# Patient Record
Sex: MALE | NOT HISPANIC OR LATINO | URBAN - METROPOLITAN AREA
[De-identification: names, ages, dates, MRNs, and addresses within clinical notes are randomized per-mention and may not be internally consistent; named-entity substitution may affect disease eponyms.]

---

## 2020-12-02 ENCOUNTER — HOSPITAL ENCOUNTER (INPATIENT)
Facility: MEDICAL CENTER | Age: 65
LOS: 6 days | DRG: 023 | End: 2020-12-08
Attending: EMERGENCY MEDICINE | Admitting: INTERNAL MEDICINE
Payer: MEDICARE

## 2020-12-02 ENCOUNTER — APPOINTMENT (OUTPATIENT)
Dept: RADIOLOGY | Facility: MEDICAL CENTER | Age: 65
DRG: 023 | End: 2020-12-02
Attending: PSYCHIATRY & NEUROLOGY
Payer: MEDICARE

## 2020-12-02 ENCOUNTER — HOSPITAL ENCOUNTER (OUTPATIENT)
Dept: RADIOLOGY | Facility: MEDICAL CENTER | Age: 65
End: 2020-12-02
Payer: MEDICARE

## 2020-12-02 ENCOUNTER — APPOINTMENT (OUTPATIENT)
Dept: RADIOLOGY | Facility: MEDICAL CENTER | Age: 65
DRG: 023 | End: 2020-12-02
Attending: EMERGENCY MEDICINE
Payer: MEDICARE

## 2020-12-02 DIAGNOSIS — I63.232 ACUTE ISCHEMIC LEFT INTERNAL CAROTID ARTERY (ICA) STROKE (HCC): ICD-10-CM

## 2020-12-02 DIAGNOSIS — I63.412 CEREBROVASCULAR ACCIDENT (CVA) DUE TO EMBOLISM OF LEFT MIDDLE CEREBRAL ARTERY (HCC): ICD-10-CM

## 2020-12-02 DIAGNOSIS — R53.1 RIGHT SIDED WEAKNESS: ICD-10-CM

## 2020-12-02 DIAGNOSIS — I63.512 ACUTE ISCHEMIC LEFT MCA STROKE (HCC): ICD-10-CM

## 2020-12-02 DIAGNOSIS — I63.032 CEREBROVASCULAR ACCIDENT (CVA) DUE TO THROMBOSIS OF LEFT CAROTID ARTERY (HCC): ICD-10-CM

## 2020-12-02 PROBLEM — J20.9 ACUTE BRONCHITIS: Status: ACTIVE | Noted: 2020-12-02

## 2020-12-02 PROBLEM — J44.1 ACUTE EXACERBATION OF CHRONIC OBSTRUCTIVE PULMONARY DISEASE (COPD) (HCC): Status: ACTIVE | Noted: 2020-12-02

## 2020-12-02 LAB
ABO + RH BLD: NORMAL
ABO GROUP BLD: NORMAL
ALBUMIN SERPL BCP-MCNC: 4.5 G/DL (ref 3.2–4.9)
ALBUMIN/GLOB SERPL: 1.7 G/DL
ALP SERPL-CCNC: 102 U/L (ref 30–99)
ALT SERPL-CCNC: 13 U/L (ref 2–50)
ANION GAP SERPL CALC-SCNC: 11 MMOL/L (ref 7–16)
APTT PPP: 30.9 SEC (ref 24.7–36)
AST SERPL-CCNC: 14 U/L (ref 12–45)
BASOPHILS # BLD AUTO: 0.4 % (ref 0–1.8)
BASOPHILS # BLD: 0.05 K/UL (ref 0–0.12)
BILIRUB SERPL-MCNC: 0.5 MG/DL (ref 0.1–1.5)
BLD GP AB SCN SERPL QL: NORMAL
BUN SERPL-MCNC: 12 MG/DL (ref 8–22)
CALCIUM SERPL-MCNC: 9.8 MG/DL (ref 8.5–10.5)
CHLORIDE SERPL-SCNC: 98 MMOL/L (ref 96–112)
CO2 SERPL-SCNC: 24 MMOL/L (ref 20–33)
COVID ORDER STATUS COVID19: NORMAL
CREAT SERPL-MCNC: 0.87 MG/DL (ref 0.5–1.4)
EKG IMPRESSION: NORMAL
EOSINOPHIL # BLD AUTO: 0.02 K/UL (ref 0–0.51)
EOSINOPHIL NFR BLD: 0.2 % (ref 0–6.9)
ERYTHROCYTE [DISTWIDTH] IN BLOOD BY AUTOMATED COUNT: 46.5 FL (ref 35.9–50)
GLOBULIN SER CALC-MCNC: 2.7 G/DL (ref 1.9–3.5)
GLUCOSE SERPL-MCNC: 151 MG/DL (ref 65–99)
HCT VFR BLD AUTO: 49.4 % (ref 42–52)
HGB BLD-MCNC: 16.6 G/DL (ref 14–18)
IMM GRANULOCYTES # BLD AUTO: 0.05 K/UL (ref 0–0.11)
IMM GRANULOCYTES NFR BLD AUTO: 0.4 % (ref 0–0.9)
INR PPP: 1 (ref 0.87–1.13)
LYMPHOCYTES # BLD AUTO: 1.88 K/UL (ref 1–4.8)
LYMPHOCYTES NFR BLD: 14.5 % (ref 22–41)
MCH RBC QN AUTO: 30.3 PG (ref 27–33)
MCHC RBC AUTO-ENTMCNC: 33.6 G/DL (ref 33.7–35.3)
MCV RBC AUTO: 90.1 FL (ref 81.4–97.8)
MONOCYTES # BLD AUTO: 0.92 K/UL (ref 0–0.85)
MONOCYTES NFR BLD AUTO: 7.1 % (ref 0–13.4)
NEUTROPHILS # BLD AUTO: 10.07 K/UL (ref 1.82–7.42)
NEUTROPHILS NFR BLD: 77.4 % (ref 44–72)
NRBC # BLD AUTO: 0 K/UL
NRBC BLD-RTO: 0 /100 WBC
PLATELET # BLD AUTO: 158 K/UL (ref 164–446)
PMV BLD AUTO: 10 FL (ref 9–12.9)
POTASSIUM SERPL-SCNC: 4.4 MMOL/L (ref 3.6–5.5)
PROT SERPL-MCNC: 7.2 G/DL (ref 6–8.2)
PROTHROMBIN TIME: 13.5 SEC (ref 12–14.6)
RBC # BLD AUTO: 5.48 M/UL (ref 4.7–6.1)
RH BLD: NORMAL
SARS-COV+SARS-COV-2 AG RESP QL IA.RAPID: NOTDETECTED
SODIUM SERPL-SCNC: 133 MMOL/L (ref 135–145)
SPECIMEN SOURCE: NORMAL
TROPONIN T SERPL-MCNC: 9 NG/L (ref 6–19)
WBC # BLD AUTO: 13 K/UL (ref 4.8–10.8)

## 2020-12-02 PROCEDURE — 80053 COMPREHEN METABOLIC PANEL: CPT

## 2020-12-02 PROCEDURE — 70498 CT ANGIOGRAPHY NECK: CPT

## 2020-12-02 PROCEDURE — 700101 HCHG RX REV CODE 250: Performed by: INTERNAL MEDICINE

## 2020-12-02 PROCEDURE — 93005 ELECTROCARDIOGRAM TRACING: CPT

## 2020-12-02 PROCEDURE — 700111 HCHG RX REV CODE 636 W/ 250 OVERRIDE (IP)

## 2020-12-02 PROCEDURE — 87426 SARSCOV CORONAVIRUS AG IA: CPT

## 2020-12-02 PROCEDURE — 85730 THROMBOPLASTIN TIME PARTIAL: CPT

## 2020-12-02 PROCEDURE — B31R1ZZ FLUOROSCOPY OF INTRACRANIAL ARTERIES USING LOW OSMOLAR CONTRAST: ICD-10-PCS | Performed by: RADIOLOGY

## 2020-12-02 PROCEDURE — 700111 HCHG RX REV CODE 636 W/ 250 OVERRIDE (IP): Mod: JG | Performed by: INTERNAL MEDICINE

## 2020-12-02 PROCEDURE — 99223 1ST HOSP IP/OBS HIGH 75: CPT | Mod: AI | Performed by: INTERNAL MEDICINE

## 2020-12-02 PROCEDURE — 700111 HCHG RX REV CODE 636 W/ 250 OVERRIDE (IP): Performed by: RADIOLOGY

## 2020-12-02 PROCEDURE — 85025 COMPLETE CBC W/AUTO DIFF WBC: CPT

## 2020-12-02 PROCEDURE — 03CL3Z7 EXTIRPATION OF MATTER FROM LEFT INTERNAL CAROTID ARTERY USING STENT RETRIEVER, PERCUTANEOUS APPROACH: ICD-10-PCS | Performed by: RADIOLOGY

## 2020-12-02 PROCEDURE — 86901 BLOOD TYPING SEROLOGIC RH(D): CPT

## 2020-12-02 PROCEDURE — 700117 HCHG RX CONTRAST REV CODE 255: Performed by: PSYCHIATRY & NEUROLOGY

## 2020-12-02 PROCEDURE — 99291 CRITICAL CARE FIRST HOUR: CPT

## 2020-12-02 PROCEDURE — 86850 RBC ANTIBODY SCREEN: CPT

## 2020-12-02 PROCEDURE — 71045 X-RAY EXAM CHEST 1 VIEW: CPT

## 2020-12-02 PROCEDURE — 86900 BLOOD TYPING SEROLOGIC ABO: CPT

## 2020-12-02 PROCEDURE — 37186 SEC ART THROMBECTOMY ADD-ON: CPT

## 2020-12-02 PROCEDURE — 70496 CT ANGIOGRAPHY HEAD: CPT

## 2020-12-02 PROCEDURE — 99223 1ST HOSP IP/OBS HIGH 75: CPT | Performed by: PSYCHIATRY & NEUROLOGY

## 2020-12-02 PROCEDURE — 84484 ASSAY OF TROPONIN QUANT: CPT

## 2020-12-02 PROCEDURE — 36415 COLL VENOUS BLD VENIPUNCTURE: CPT

## 2020-12-02 PROCEDURE — 037L3DZ DILATION OF LEFT INTERNAL CAROTID ARTERY WITH INTRALUMINAL DEVICE, PERCUTANEOUS APPROACH: ICD-10-PCS | Performed by: RADIOLOGY

## 2020-12-02 PROCEDURE — 770022 HCHG ROOM/CARE - ICU (200)

## 2020-12-02 PROCEDURE — C1887 CATHETER, GUIDING: HCPCS

## 2020-12-02 PROCEDURE — 85610 PROTHROMBIN TIME: CPT

## 2020-12-02 PROCEDURE — 99291 CRITICAL CARE FIRST HOUR: CPT | Performed by: INTERNAL MEDICINE

## 2020-12-02 PROCEDURE — B3171ZZ FLUOROSCOPY OF LEFT INTERNAL CAROTID ARTERY USING LOW OSMOLAR CONTRAST: ICD-10-PCS | Performed by: RADIOLOGY

## 2020-12-02 PROCEDURE — 0042T CT-CEREBRAL PERFUSION ANALYSIS: CPT

## 2020-12-02 PROCEDURE — 700105 HCHG RX REV CODE 258: Performed by: INTERNAL MEDICINE

## 2020-12-02 PROCEDURE — 03CG3Z7 EXTIRPATION OF MATTER FROM INTRACRANIAL ARTERY USING STENT RETRIEVER, PERCUTANEOUS APPROACH: ICD-10-PCS | Performed by: RADIOLOGY

## 2020-12-02 RX ORDER — EPTIFIBATIDE 0.75 MG/ML
2 INJECTION, SOLUTION INTRAVENOUS CONTINUOUS
Status: DISPENSED | OUTPATIENT
Start: 2020-12-02 | End: 2020-12-03

## 2020-12-02 RX ORDER — ASPIRIN 300 MG/1
300 SUPPOSITORY RECTAL ONCE
Status: DISCONTINUED | OUTPATIENT
Start: 2020-12-02 | End: 2020-12-02

## 2020-12-02 RX ORDER — ASPIRIN 81 MG/1
81 TABLET, CHEWABLE ORAL DAILY
Status: DISCONTINUED | OUTPATIENT
Start: 2020-12-03 | End: 2020-12-02

## 2020-12-02 RX ORDER — CLOPIDOGREL BISULFATE 75 MG/1
75 TABLET ORAL DAILY
Status: DISCONTINUED | OUTPATIENT
Start: 2020-12-03 | End: 2020-12-02

## 2020-12-02 RX ORDER — ATORVASTATIN CALCIUM 20 MG/1
80 TABLET, FILM COATED ORAL EVERY EVENING
Status: DISCONTINUED | OUTPATIENT
Start: 2020-12-02 | End: 2020-12-02

## 2020-12-02 RX ORDER — EPTIFIBATIDE 2 MG/ML
180 INJECTION, SOLUTION INTRAVENOUS ONCE
Status: COMPLETED | OUTPATIENT
Start: 2020-12-02 | End: 2020-12-02

## 2020-12-02 RX ORDER — CLOPIDOGREL BISULFATE 75 MG/1
75 TABLET ORAL DAILY
Status: DISCONTINUED | OUTPATIENT
Start: 2020-12-03 | End: 2020-12-03

## 2020-12-02 RX ORDER — LABETALOL HYDROCHLORIDE 5 MG/ML
10-20 INJECTION, SOLUTION INTRAVENOUS EVERY 4 HOURS PRN
Status: DISCONTINUED | OUTPATIENT
Start: 2020-12-02 | End: 2020-12-08 | Stop reason: HOSPADM

## 2020-12-02 RX ORDER — MIDAZOLAM HYDROCHLORIDE 1 MG/ML
INJECTION INTRAMUSCULAR; INTRAVENOUS
Status: COMPLETED
Start: 2020-12-02 | End: 2020-12-02

## 2020-12-02 RX ORDER — EPTIFIBATIDE 2 MG/ML
INJECTION, SOLUTION INTRAVENOUS
Status: COMPLETED
Start: 2020-12-02 | End: 2020-12-02

## 2020-12-02 RX ORDER — LABETALOL HYDROCHLORIDE 5 MG/ML
10 INJECTION, SOLUTION INTRAVENOUS EVERY 4 HOURS PRN
Status: DISCONTINUED | OUTPATIENT
Start: 2020-12-02 | End: 2020-12-02

## 2020-12-02 RX ORDER — AMOXICILLIN 250 MG
2 CAPSULE ORAL 2 TIMES DAILY
Status: DISCONTINUED | OUTPATIENT
Start: 2020-12-02 | End: 2020-12-08 | Stop reason: HOSPADM

## 2020-12-02 RX ORDER — BISACODYL 10 MG
10 SUPPOSITORY, RECTAL RECTAL
Status: DISCONTINUED | OUTPATIENT
Start: 2020-12-02 | End: 2020-12-08 | Stop reason: HOSPADM

## 2020-12-02 RX ORDER — ATORVASTATIN CALCIUM 40 MG/1
40 TABLET, FILM COATED ORAL EVERY EVENING
Status: DISCONTINUED | OUTPATIENT
Start: 2020-12-02 | End: 2020-12-08 | Stop reason: HOSPADM

## 2020-12-02 RX ORDER — POLYETHYLENE GLYCOL 3350 17 G/17G
1 POWDER, FOR SOLUTION ORAL
Status: DISCONTINUED | OUTPATIENT
Start: 2020-12-02 | End: 2020-12-08 | Stop reason: HOSPADM

## 2020-12-02 RX ORDER — ACETAMINOPHEN 325 MG/1
650 TABLET ORAL EVERY 6 HOURS PRN
Status: DISCONTINUED | OUTPATIENT
Start: 2020-12-02 | End: 2020-12-08 | Stop reason: HOSPADM

## 2020-12-02 RX ADMIN — ATROPINE SULFATE 0.5 MG: 0.1 INJECTION, SOLUTION ENDOTRACHEAL; INTRAMUSCULAR; INTRAVENOUS; SUBCUTANEOUS at 22:18

## 2020-12-02 RX ADMIN — EPTIFIBATIDE 15264 MCG: 2 INJECTION, SOLUTION INTRAVENOUS at 22:28

## 2020-12-02 RX ADMIN — IOHEXOL 80 ML: 350 INJECTION, SOLUTION INTRAVENOUS at 20:25

## 2020-12-02 RX ADMIN — IOHEXOL 100 ML: 300 INJECTION, SOLUTION INTRAVENOUS at 23:17

## 2020-12-02 RX ADMIN — EPTIFIBATIDE 2 MCG/KG/MIN: 0.75 INJECTION, SOLUTION INTRAVENOUS at 22:54

## 2020-12-02 RX ADMIN — SODIUM CHLORIDE 5 MG/HR: 9 INJECTION, SOLUTION INTRAVENOUS at 23:29

## 2020-12-02 RX ADMIN — IOHEXOL 40 ML: 350 INJECTION, SOLUTION INTRAVENOUS at 20:24

## 2020-12-02 ASSESSMENT — ENCOUNTER SYMPTOMS: SPEECH CHANGE: 1

## 2020-12-03 ENCOUNTER — APPOINTMENT (OUTPATIENT)
Dept: RADIOLOGY | Facility: MEDICAL CENTER | Age: 65
DRG: 023 | End: 2020-12-03
Attending: INTERNAL MEDICINE
Payer: MEDICARE

## 2020-12-03 LAB
ALBUMIN SERPL BCP-MCNC: 3.6 G/DL (ref 3.2–4.9)
ALBUMIN/GLOB SERPL: 1.5 G/DL
ALP SERPL-CCNC: 87 U/L (ref 30–99)
ALT SERPL-CCNC: 13 U/L (ref 2–50)
AMPHET UR QL SCN: NEGATIVE
ANION GAP SERPL CALC-SCNC: 11 MMOL/L (ref 7–16)
ANION GAP SERPL CALC-SCNC: 9 MMOL/L (ref 7–16)
APPEARANCE UR: CLEAR
AST SERPL-CCNC: 14 U/L (ref 12–45)
BARBITURATES UR QL SCN: NEGATIVE
BENZODIAZ UR QL SCN: NEGATIVE
BILIRUB SERPL-MCNC: 0.5 MG/DL (ref 0.1–1.5)
BILIRUB UR QL STRIP.AUTO: NEGATIVE
BUN SERPL-MCNC: 12 MG/DL (ref 8–22)
BUN SERPL-MCNC: 12 MG/DL (ref 8–22)
BZE UR QL SCN: NEGATIVE
CALCIUM SERPL-MCNC: 8.4 MG/DL (ref 8.5–10.5)
CALCIUM SERPL-MCNC: 9 MG/DL (ref 8.5–10.5)
CANNABINOIDS UR QL SCN: POSITIVE
CHLORIDE SERPL-SCNC: 101 MMOL/L (ref 96–112)
CHLORIDE SERPL-SCNC: 105 MMOL/L (ref 96–112)
CHOLEST SERPL-MCNC: 151 MG/DL (ref 100–199)
CO2 SERPL-SCNC: 21 MMOL/L (ref 20–33)
CO2 SERPL-SCNC: 22 MMOL/L (ref 20–33)
COLOR UR: YELLOW
CREAT SERPL-MCNC: 0.81 MG/DL (ref 0.5–1.4)
CREAT SERPL-MCNC: 0.85 MG/DL (ref 0.5–1.4)
ERYTHROCYTE [DISTWIDTH] IN BLOOD BY AUTOMATED COUNT: 45.1 FL (ref 35.9–50)
EST. AVERAGE GLUCOSE BLD GHB EST-MCNC: 166 MG/DL
GLOBULIN SER CALC-MCNC: 2.4 G/DL (ref 1.9–3.5)
GLUCOSE SERPL-MCNC: 152 MG/DL (ref 65–99)
GLUCOSE SERPL-MCNC: 156 MG/DL (ref 65–99)
GLUCOSE UR STRIP.AUTO-MCNC: NEGATIVE MG/DL
HBA1C MFR BLD: 7.4 % (ref 0–5.6)
HCT VFR BLD AUTO: 46.3 % (ref 42–52)
HDLC SERPL-MCNC: 52 MG/DL
HGB BLD-MCNC: 15.4 G/DL (ref 14–18)
KETONES UR STRIP.AUTO-MCNC: ABNORMAL MG/DL
LDLC SERPL CALC-MCNC: 81 MG/DL
LEUKOCYTE ESTERASE UR QL STRIP.AUTO: NEGATIVE
MCH RBC QN AUTO: 29.6 PG (ref 27–33)
MCHC RBC AUTO-ENTMCNC: 33.3 G/DL (ref 33.7–35.3)
MCV RBC AUTO: 89 FL (ref 81.4–97.8)
METHADONE UR QL SCN: NEGATIVE
MICRO URNS: ABNORMAL
NITRITE UR QL STRIP.AUTO: NEGATIVE
OPIATES UR QL SCN: NEGATIVE
OXYCODONE UR QL SCN: NEGATIVE
PCP UR QL SCN: NEGATIVE
PH UR STRIP.AUTO: 5 [PH] (ref 5–8)
PLATELET # BLD AUTO: 150 K/UL (ref 164–446)
PMV BLD AUTO: 10.4 FL (ref 9–12.9)
POTASSIUM SERPL-SCNC: 4 MMOL/L (ref 3.6–5.5)
POTASSIUM SERPL-SCNC: 4 MMOL/L (ref 3.6–5.5)
PROPOXYPH UR QL SCN: NEGATIVE
PROT SERPL-MCNC: 6 G/DL (ref 6–8.2)
PROT UR QL STRIP: NEGATIVE MG/DL
RBC # BLD AUTO: 5.2 M/UL (ref 4.7–6.1)
RBC UR QL AUTO: NEGATIVE
SODIUM SERPL-SCNC: 134 MMOL/L (ref 135–145)
SODIUM SERPL-SCNC: 135 MMOL/L (ref 135–145)
SP GR UR REFRACTOMETRY: >1.045
TRIGL SERPL-MCNC: 92 MG/DL (ref 0–149)
UROBILINOGEN UR STRIP.AUTO-MCNC: 0.2 MG/DL
WBC # BLD AUTO: 9 K/UL (ref 4.8–10.8)

## 2020-12-03 PROCEDURE — 85027 COMPLETE CBC AUTOMATED: CPT

## 2020-12-03 PROCEDURE — 80061 LIPID PANEL: CPT

## 2020-12-03 PROCEDURE — 70450 CT HEAD/BRAIN W/O DYE: CPT

## 2020-12-03 PROCEDURE — 700105 HCHG RX REV CODE 258: Performed by: INTERNAL MEDICINE

## 2020-12-03 PROCEDURE — 74018 RADEX ABDOMEN 1 VIEW: CPT

## 2020-12-03 PROCEDURE — A9270 NON-COVERED ITEM OR SERVICE: HCPCS | Performed by: INTERNAL MEDICINE

## 2020-12-03 PROCEDURE — 80053 COMPREHEN METABOLIC PANEL: CPT

## 2020-12-03 PROCEDURE — 80048 BASIC METABOLIC PNL TOTAL CA: CPT

## 2020-12-03 PROCEDURE — 81003 URINALYSIS AUTO W/O SCOPE: CPT

## 2020-12-03 PROCEDURE — 700111 HCHG RX REV CODE 636 W/ 250 OVERRIDE (IP): Mod: JG | Performed by: INTERNAL MEDICINE

## 2020-12-03 PROCEDURE — 700102 HCHG RX REV CODE 250 W/ 637 OVERRIDE(OP): Performed by: INTERNAL MEDICINE

## 2020-12-03 PROCEDURE — 51798 US URINE CAPACITY MEASURE: CPT

## 2020-12-03 PROCEDURE — 700101 HCHG RX REV CODE 250: Performed by: INTERNAL MEDICINE

## 2020-12-03 PROCEDURE — 770022 HCHG ROOM/CARE - ICU (200)

## 2020-12-03 PROCEDURE — 83036 HEMOGLOBIN GLYCOSYLATED A1C: CPT

## 2020-12-03 PROCEDURE — A9270 NON-COVERED ITEM OR SERVICE: HCPCS | Performed by: PSYCHIATRY & NEUROLOGY

## 2020-12-03 PROCEDURE — 99232 SBSQ HOSP IP/OBS MODERATE 35: CPT | Performed by: PSYCHIATRY & NEUROLOGY

## 2020-12-03 PROCEDURE — 92610 EVALUATE SWALLOWING FUNCTION: CPT

## 2020-12-03 PROCEDURE — 99291 CRITICAL CARE FIRST HOUR: CPT | Performed by: INTERNAL MEDICINE

## 2020-12-03 PROCEDURE — 80307 DRUG TEST PRSMV CHEM ANLYZR: CPT

## 2020-12-03 PROCEDURE — 700102 HCHG RX REV CODE 250 W/ 637 OVERRIDE(OP): Performed by: PSYCHIATRY & NEUROLOGY

## 2020-12-03 RX ORDER — ASPIRIN 300 MG/1
300 SUPPOSITORY RECTAL DAILY
Status: DISCONTINUED | OUTPATIENT
Start: 2020-12-03 | End: 2020-12-04

## 2020-12-03 RX ORDER — CLOPIDOGREL BISULFATE 75 MG/1
75 TABLET ORAL DAILY
Status: DISCONTINUED | OUTPATIENT
Start: 2020-12-04 | End: 2020-12-08 | Stop reason: HOSPADM

## 2020-12-03 RX ORDER — CLOPIDOGREL BISULFATE 75 MG/1
300 TABLET ORAL ONCE
Status: COMPLETED | OUTPATIENT
Start: 2020-12-03 | End: 2020-12-03

## 2020-12-03 RX ORDER — ASPIRIN 325 MG
325 TABLET ORAL DAILY
Status: DISCONTINUED | OUTPATIENT
Start: 2020-12-03 | End: 2020-12-04

## 2020-12-03 RX ORDER — CLOPIDOGREL BISULFATE 75 MG/1
75 TABLET ORAL DAILY
Status: DISCONTINUED | OUTPATIENT
Start: 2020-12-04 | End: 2020-12-03

## 2020-12-03 RX ADMIN — CLOPIDOGREL BISULFATE 300 MG: 75 TABLET ORAL at 10:45

## 2020-12-03 RX ADMIN — SODIUM CHLORIDE 2.5 MG/HR: 9 INJECTION, SOLUTION INTRAVENOUS at 05:23

## 2020-12-03 RX ADMIN — EPTIFIBATIDE 2 MCG/KG/MIN: 0.75 INJECTION, SOLUTION INTRAVENOUS at 05:39

## 2020-12-03 RX ADMIN — ATORVASTATIN CALCIUM 40 MG: 40 TABLET, FILM COATED ORAL at 18:01

## 2020-12-03 RX ADMIN — ASPIRIN 325 MG: 325 TABLET, FILM COATED ORAL at 10:45

## 2020-12-03 ASSESSMENT — FIBROSIS 4 INDEX: FIB4 SCORE: 1.6

## 2020-12-03 NOTE — DISCHARGE PLANNING
[facility] Transitional Care Coordination     Referral from:  Dr. Castro   Facesheet indicates: no provider identified   Potential Rehab Diagnosis: stroke       D/C support: Unknown    Physiatry consultation pending per protocol.        Last Covid test date.  Results for TYLER MERCADO (MRN [id number]) as of 12/3/2020 09:45   Ref. Range 12/2/2020 20:09   COVID Order Status Unknown Received   SARS-COV ANTIGEN HARDEEP Latest Ref Range: Not-Detected  NotDetected   SARS-CoV-2 Source Unknown Nasal Swab   Waiting on additional information to determine appropriateness for acute inpatient rehabilitation. Will continue to follow.     Thank you for the referral.

## 2020-12-03 NOTE — THERAPY
Speech Language Pathology   Initial Assessment     Patient Name: Tejinder Ruiz  AGE:  65 y.o., SEX:  male  Medical Record #: [id number]  Today's Date: 12/3/2020     Precautions  Precautions: Fall Risk, Swallow Precautions ( See Comments)    Assessment    Patient is 65 y.o. male admitted 12/1 with profound expresisve aphasia. Imaging revealed left ICA and left basilar artery with acute left MCA stroke symptoms. Status post thrombectomy on 12/2. No previous SLP notes in EMR.  Imaging indicated negative CXR. Brain MRI pending.   Patient presented with expressive/receptive language difficulty throughout the evaluation with SLP. However, noted patient to improve in expressive and receptive language throughout the session, going from nonverbal to responding in 1-2 word phrases. Receptive language difficulty v. Questionable apraxic behavior noted during the oral motor exam, as patient had difficulty following 1-step directions for lingual ROM and labial protraction/retraction. As a result, oral motor examination discontinued.   Patient participated in oral care and brief suction to clear phlegm from oral cavity prior to initiation of PO trials. Patient consumed trials of ice chips, thin liquid (TN0) via tsp, controlled single cup sip and straw, mildly thick liquid (MT2) via tsp, controlled cup sip and straw, liquidized textures (LQ3), pureed textures (PU4) and minced and moist textures (MM5). Observed immediate, overt s/sx of aspiration with large cup sip and straw sip of thin liquids, with strong, prolonged cough response. Suction attempted, but not successful to clear. No additional overt s/s of aspiration observed immediately following trials of MT2, LQ3, PU4 or MM5. However, patient did present with intermittent single wet cough, questionable if from pharyngeal residue or recovery from suspected aspiration event with thin liquid.   Patient demonstrated impulsivity with independent in take, taking large bites/sips and not  following directions for small, single bites/sips (question receptive language deficit). As a result, he will warrant strict 1:1 assistance with all PO intake to maintain safety with PO intake.     Plan  Advance diet to puree textures (PU4) and mildly thick liquids (MT2) with medication to be crushed in puree. Please provide 1:1 assistance with all intake due to patient impulsivity with independent eating/drinking and difficulty following directions for safety with intake. Discontinue if coughing.   SLP to continue to follow for dysphagia therapy and completion of cognitive-linguistic assessment as appropriate.     Recommend Speech Therapy 5 times per week until therapy goals are met for the following treatments:  Dysphagia Training.    Discharge Recommendations: Recommend post-acute placement for additional speech therapy services prior to discharge home    Subjective    Patient able to rouse with verbal cues. Demonstrated expressive/recpetive language difficulty, however, improving in these areas throughout the evaluation.      Objective       12/03/20 1017   Charge Group   SLP Oral Pharyngeal Evaluation Oral Pharyngeal Evaluation   Oral Motor Eval    Is Patient Able to Complete Oral Motor Eval Yes but Impaired   Labial Function   Labial Structure At Rest Within Functional Limits   Lingual Function   Lingual Structure At Rest Within Functional Limits   Lingual Protrude Not Tested   Lingual Retract Not Tested   Elevate In Mouth Not Tested   Elevate Outside Mouth Not Tested   Lateralization Not Tested   Lick Lips (Circular) Not Tested   Jaw   Jaw Structure At Rest Within Functional Limits   Jaw Open / Resist Not Tested   Jaw Close / Resist Not Tested   Velar Function   Velar Structure At Rest Within Functional Limits   Laryngeal Function   Voice Quality Minimal   Volutional Cough Within Functional Limits   Excursion Upon Swallow Complete   Oral Food Presentation   Ice Chips Within Functional Limits   Single Swallow  "Mildly Thick (2) - (Nectar Thick)  Within Functional Limits   Serial Swallow Mildly Thick (2) - (Nectar Thick) Not Tested   Single Swallow Thin (0) Within Functional Limits   Serial Swallow Thin (0) Moderate   Liquidised (3) Within Functional Limits   Pureed (4) Within Functional Limits   Minced & Moist (5) - (Dysphagia II) Not Tested   Soft & Bite-Sized (6) - (Dysphagia III) Not Tested   Regular (7) Not Tested   Regular-Easy to Chew (7) Not Tested   Self Feeding Needs Total Assistance   Dysphagia Strategies / Recommendations   Strategies / Interventions Recommended (Yes / No) Yes   Compensatory Strategies Strict 1:1 Feeding;No Straws;Multiple Swallows;Single Sips / Bites;Alternate Solids / Liquids   Diet / Liquid Recommendation Mildly Thick (2) - (Nectar Thick);Puree (4)   Medication Administration  Crush all Medications in Puree   Therapy Interventions Dysphagia Therapy By Speech Language Pathologist   Follow Up SLP Evaluation Cognitive-Lingustic Assessment Needed   Patient / Family Goals   Patient / Family Goal #1 Nodded \"Yes\" to oral trials   Goal #1 Outcome Progressing as expected   Short Term Goals   Short Term Goal # 1 Patient will consume puree textures (PU4) and mildly thick liquids (MT2) with 1:1 assistance without ovet s/s of aspiration    Goal Outcome # 1 Progressing as expected       "

## 2020-12-03 NOTE — PROGRESS NOTES
Critical Care Progress Note    Date of admission  12/2/2020    Chief Complaint  Mr. Ruiz is a 65 year old male with an unknown past medical history who was transferred from [facility] for stroke symptoms.  The patient was last seen normal around 8-9 pm.  The patient was found unresponsive by his roommate this evening at 6pm.  When EMS arrived, the patient had a GCS of 3 but was alert and aphasic with right sided deficits.  The patient's NIH score was 27 at the outlying facility.  He was not an alteplase candidate.  His CT-CTA head revealed thrombus to the left ICA and left basilar artery with acute left MCA stroke symptoms.  He was transferred to [facility] and seen by neurology as well as IR.  He was sent to IR for left ICA stenting and then integrilin infusion. Taken Dr Elizondo.     Hospital Course  No notes on file    Interval Problem Update  Reviewed last 24 hour events:  Goes by Brandon  Neuro: follows commands, perrl, slight right side weakness gross intact, expressive aphasia, aox4  HR: snr  SBP: goal < 140 cardene at 3  Tmax: afebrile  GI: NPO, BM   UOP: good   Lines: peripheral IV's  Resp: room air   Vte: none   PPI/H2:n/a  Antibx: none    plavix 75mg, aspirin  Failing swallow chocking  MRI pending  xr abdomen      Review of Systems  Review of Systems   Unable to perform ROS: Language        Vital Signs for last 24 hours   Temp:  [36.7 °C (98 °F)] 36.7 °C (98 °F)  Pulse:  [59-93] 68  Resp:  [14-24] 16  BP: (107-183)/(55-99) 119/59  SpO2:  [91 %-100 %] 100 %    Hemodynamic parameters for last 24 hours       Respiratory Information for the last 24 hours       Physical Exam   Physical Exam  Vitals signs and nursing note reviewed.   Constitutional:       General: He is not in acute distress.     Appearance: He is not ill-appearing.      Comments: Sitting up eating pudding in no acute distress   HENT:      Head: Normocephalic.      Nose: No congestion.      Mouth/Throat:      Mouth: Mucous membranes  are dry.      Pharynx: No oropharyngeal exudate.   Eyes:      Extraocular Movements: Extraocular movements intact.      Pupils: Pupils are equal, round, and reactive to light.   Neck:      Musculoskeletal: No neck rigidity or muscular tenderness.   Cardiovascular:      Rate and Rhythm: Normal rate.      Heart sounds: No murmur.   Pulmonary:      Effort: No respiratory distress.      Breath sounds: No stridor. No wheezing or rhonchi.   Abdominal:      General: There is no distension.      Palpations: There is no mass.      Tenderness: There is no abdominal tenderness. There is no guarding or rebound.      Hernia: No hernia is present.   Genitourinary:     Comments: Right groin site with angioseal  Musculoskeletal:         General: No swelling or tenderness.   Skin:     Coloration: Skin is not jaundiced or pale.   Neurological:      Mental Status: He is alert.      Comments: aox3-4 able to shake his head yes and no and answer some question but with expressive aphasia, weakness to right hand 3+/5, full strength on left, sensation intact, no facial droop, speech clear.    Psychiatric:         Mood and Affect: Mood normal.         Medications  Current Facility-Administered Medications   Medication Dose Route Frequency Provider Last Rate Last Admin   • clopidogrel (PLAVIX) tablet 300 mg  300 mg Oral Once Ysabel Elizondo M.D.       • atorvastatin (LIPITOR) tablet 40 mg  40 mg Oral Q EVENING Geovanny Castro M.D.   Stopped at 12/02/20 2115   • senna-docusate (PERICOLACE or SENOKOT S) 8.6-50 MG per tablet 2 Tab  2 Tab Oral BID Bigg Morfin M.D.   Stopped at 12/02/20 2145    And   • polyethylene glycol/lytes (MIRALAX) PACKET 1 Packet  1 Packet Oral QDAY PRN Bigg Morfin M.D.        And   • magnesium hydroxide (MILK OF MAGNESIA) suspension 30 mL  30 mL Oral QDAY PRN Bigg Morfin M.D.        And   • bisacodyl (DULCOLAX) suppository 10 mg  10 mg Rectal QDAY PRN Bigg Morfin M.D.       • acetaminophen (Tylenol)  tablet 650 mg  650 mg Oral Q6HRS PRN Bigg Morfin M.D.       • niCARdipine (CARDENE) 25 mg in  mL Infusion  0-15 mg/hr Intravenous Continuous Isaac Hager M.D. 40 mL/hr at 12/03/20 0800 4 mg/hr at 12/03/20 0800   • eptifibatide 0.75 mg/mL (INTEGRILIN) infusion  2 mcg/kg/min Intravenous Continuous Ysabel Elizondo M.D. 13.6 mL/hr at 12/03/20 0539 2 mcg/kg/min at 12/03/20 0539   • aspirin EC (ECOTRIN) tablet 325 mg  325 mg Oral DAILY Geovanny Castro M.D.       • labetalol (NORMODYNE/TRANDATE) injection 10-20 mg  10-20 mg Intravenous Q4HRS PRN Ysabel Elizondo M.D.           Fluids    Intake/Output Summary (Last 24 hours) at 12/3/2020 0839  Last data filed at 12/3/2020 0800  Gross per 24 hour   Intake 416.25 ml   Output 325 ml   Net 91.25 ml       Laboratory          Recent Labs     12/02/20 2014 12/03/20  0030 12/03/20  0345   SODIUM 133* 135 134*   POTASSIUM 4.4 4.0 4.0   CHLORIDE 98 105 101   CO2 24 21 22   BUN 12 12 12   CREATININE 0.87 0.85 0.81   CALCIUM 9.8 8.4* 9.0     Recent Labs     12/02/20 2014 12/03/20  0030 12/03/20  0345   ALTSGPT 13 13  --    ASTSGOT 14 14  --    ALKPHOSPHAT 102* 87  --    TBILIRUBIN 0.5 0.5  --    GLUCOSE 151* 156* 152*     Recent Labs     12/02/20 2014 12/03/20  0030 12/03/20  0345   WBC 13.0*  --  9.0   NEUTSPOLYS 77.40*  --   --    LYMPHOCYTES 14.50*  --   --    MONOCYTES 7.10  --   --    EOSINOPHILS 0.20  --   --    BASOPHILS 0.40  --   --    ASTSGOT 14 14  --    ALTSGPT 13 13  --    ALKPHOSPHAT 102* 87  --    TBILIRUBIN 0.5 0.5  --      Recent Labs     12/02/20 2014 12/03/20  0345   RBC 5.48 5.20   HEMOGLOBIN 16.6 15.4   HEMATOCRIT 49.4 46.3   PLATELETCT 158* 150*   PROTHROMBTM 13.5  --    APTT 30.9  --    INR 1.00  --        Imaging  X-Ray:  I have personally reviewed the images and compared with prior images.  CT:    Reviewed    Assessment/Plan  * Acute ischemic left MCA stroke (HCC)- (present on admission)  Assessment & Plan  Due to left ICA  stenosis/thrombus with left basilar artery thrombus  Not a candidate for thrombolytics  IR suite for left ICA thrombus with right M1 thrombus-->s/p thrombectomy with stent and MCA thrombectomy:  TICI 3  Integrilin infusion ongoing until 12/3 at 1500  SBP goals < 140  Nicardipine drip for SBP goals as well as labetalol as needed  High dose statin therapy  Load aspirin 325mg and Plavix 300mg at 11 am  Maintain euglycemia with BG goals 120-180s  Neuro checks every 1 hours  Neurology following  ECHO  PT/OT/SLP           VTE:  integrelin gtt  Ulcer: Not Indicated  Lines: None    I have performed a physical exam and reviewed and updated ROS and Plan today (12/3/2020). In review of yesterday's note (12/2/2020), there are no changes except as documented above.     Discussed patient condition and risk of morbidity and/or mortality with RN, RT, Pharmacy, Charge nurse / hot rounds, Patient and neurology     The patient remains critically ill post thrombectomy on integerlin gtt.  Critical care time = 40 minutes in directly providing and coordinating critical care and extensive data review.  No time overlap and excludes procedures.

## 2020-12-03 NOTE — ED TRIAGE NOTES
"Chief Complaint   Patient presents with   • Possible Stroke     Patient a transfer from [facility] with a L ICA occlusion; pt to go to IR       BIB Care flight to RD 12, pt on monitor and in gown. Pt came from [facility], per Careflight patient was found unresponsive with Right sided deficits, last known to be \"normal\" 8-9pm last night. WHen found down GCS of 3. When taken to [facility] patient was alert and oriented with aphasia and right sided weakness. Per [facility] patient has a L ICA occlusion, patient a STROKE IR. Patient has a cough per careflight, rule-out COVID. Upon arrival, careflight stated pt attempted at speech. Patient did not receive TPA. NIH 16 upon arrival.    Patient to CT.     Medications given en route: 4 mg of zofran    Ht 1.753 m (5' 9\")   Wt 84.8 kg (186 lb 15.2 oz)   BMI 27.61 kg/m²   "

## 2020-12-03 NOTE — CONSULTS
"Neurology STROKE IR CODE H&P  Neurohospitalist Service, [facility]    Referring Physician: Cande Hyde M.D.    STROKE IR CODE: not a candidate for tPA given LKN is 2100 12/1/20.    HPI: Tejinder Ruiz is a 65 y.o. man with vascular risk factors presenting for whom neurology has been consulted as a transfer from [facility] in the setting of acute L ICA occlusion as evidenced on outside CTA imaging.  Unable to obtain subjective history given aphasia.  As documented by Cande Hyde MD on 12/2/20, \"Per EMS, the patient was found unresponsive on the ground on his right side with right sided deficits. He was last seen at normal baseline at 8-9 PM yesterday. He was brought into [facility] with GCS of 3 and was noted to be alert but aphasic. He had a NIH of 27 of the outside facility and now in the ED [[facility]] he has an NIH of 16. On route to the ED, his pressure was around 150s-180s over 90s.\"  Right sided weakness and severe aphasia persist.  When I was paged for this patient, the first action taken was to order STAT CTP study to identify evidence of penumbra.    Review of systems: In addition to what is detailed in the HPI above, all other systems reviewed and are negative.  Does not have a headache nor vertigo.    Past Medical History:   Unable to obtain given severe aphasia    FHx:  Unable to obtain given severe aphasia    SHx:  Unable to obtain given severe aphasia    Allergies:  Unable to obtain given severe aphasia    Medications:  No current facility-administered medications for this encounter.   No current outpatient medications on file.    Physical Examination:    Vitals:    12/02/20 2015 12/02/20 2034   BP:  (!) 183/99   Pulse:  74   SpO2:  97%   Weight: 84.8 kg (186 lb 15.2 oz)    Height: 1.753 m (5' 9\")    SBP at 2020 12/2/20: 180s    General: Patient is in no acute distress  Eyes: examination of optic disks not indicated at this time given acuity of consult  CV: " RRR    NEUROLOGICAL EXAM:     Mental status: Awake, alert and disoriented, does not follow simple commands  Speech and language: speech output diminished. The patient is unable to name or repeat.  Severe aphasia.  Cranial nerve exam: Pupils are equal, round and reactive to light bilaterally. Visual fields: diminished blink to threat on the right compared to left. Extraocular muscles are intact to facial tracking with notable right gaze preference. Face is notable for right sided nasolabial fold flattening. Due to degree of aphasia, unable to appropriately assess sensation in the face, hearing to finger rub, palate elevation, shoulder shrug, or tongue.  Motor exam: right sided hemiplegia with flaccid tone on right compared to left. No abnormal movements were seen on exam.  Sensory exam: diminished withdrawal from noxious stimuli b/l  Deep tendon reflexes: 1+ and symmetric  Coordination: not participatory  Gait: deferred given patient preference    NIH Stroke Scale:    1a. Level of Consciousness (Alert, drowsy, etc): 0= Alert    1b. LOC Questions (Month, age): 2= Incorrect    1c. LOC Commands (Open/close eyes make fist/let go): 2= Incorrect    2.   Best Gaze (Eyes open - patient follows examiner's finger on face): 1= Partial gaze palay    3.   Visual Fields (introduce visual stimulus/threat to patient's field quadrants): 1= Partial Hemiania  4.   Facial Paresis (Show teeth, raise eyebrows and squeeze eyes shut): 1= Minor     5a. Motor Arm - Left (Elevate arm to 90 degrees if patient is sitting, 45 degrees if  supine): 0= No drift    5b. Motor Arm - Right (Elevate arm to 90 degrees if patient is sitting, 45 degrees if supine): 3= No effort against gravity    6a. Motor Leg - Left (Elevate leg 30 degrees with patient supine): 0= No drift    6b. Motor Leg - Right  (Elevate leg 30 degrees with patient supine): 3= No effort against gravity    7.   Limb Ataxia (Finger-nose, heel down shin): 0= No ataxia    8.   Sensory (Pin  prick to face, arm, trunk and leg - compare side to side): 0= Normal    9.  Best Language (Name item, describe a picture and read sentences): 2= Severe aphasia    10. Dysarthria (Evaluate speech clarity by patient repeating listed words): 2= Near to unintelligible or worse    11. Extinction and Inattention (Use information from prior testing to identify neglect or  double simultaneous stimuli testing): 0= No neglect    Total NIH Score: 16    Modified Snyder Scale (MRS): 2 = Slight disability; unable to perform all previous activities but able to look after own affairs without assistance    Objective Data:    Labs:  No results found for: PROTHROMBTM, INR   Lab Results   Component Value Date/Time    WBC 13.0 (H) 12/02/2020 08:14 PM    RBC 5.48 12/02/2020 08:14 PM    HEMOGLOBIN 16.6 12/02/2020 08:14 PM    HEMATOCRIT 49.4 12/02/2020 08:14 PM    MCV 90.1 12/02/2020 08:14 PM    MCH 30.3 12/02/2020 08:14 PM    MCHC 33.6 (L) 12/02/2020 08:14 PM    MPV 10.0 12/02/2020 08:14 PM    NEUTSPOLYS 77.40 (H) 12/02/2020 08:14 PM    LYMPHOCYTES 14.50 (L) 12/02/2020 08:14 PM    MONOCYTES 7.10 12/02/2020 08:14 PM    EOSINOPHILS 0.20 12/02/2020 08:14 PM    BASOPHILS 0.40 12/02/2020 08:14 PM      No results found for: SODIUM, POTASSIUM, CHLORIDE, CO2, GLUCOSE, BUN, CREATININE, BUNCREATRAT, GLOMRATE   No results found for: CHOLSTRLTOT, LDL, HDL, TRIGLYCERIDE    No results found for: ALKPHOSPHAT, ASTSGOT, ALTSGPT, TBILIRUBIN     Imaging/Testing:    I interpreted and/or reviewed the patient's neuroimaging    OUTSIDE IMAGES-DX CHEST   Final Result      OUTSIDE IMAGES-CT HEAD   Final Result      OUTSIDE IMAGES-CT HEAD   Final Result      DX-CHEST-PORTABLE (1 VIEW)    (Results Pending)   CT-CTA HEAD WITH & W/O-POST PROCESS    (Results Pending)   CT-CTA NECK WITH & W/O-POST PROCESSING    (Results Pending)   CT-CEREBRAL PERFUSION ANALYSIS    (Results Pending)   MR-BRAIN-W/O    (Results Pending)     CTA neck 12/2/20: L ICA is severely  stenotic  CTA head 12/2/20: L M1 stenosis    Assessment and Plan:    Tejinder Ruiz is a 65 y.o. man with vascular risk factors presenting at a transfer from [facility] evening of 12/2/20, for whom neurology has been consulted for acute L MCA syndrome.  Patient out of window to be considered a candidate for tPA.  Although the CTP does show an area of oligemia, the CTA does not demonstrate overt intracranial occlusion, but rather evidence of severe flow limiting stenosis localized to the left M1.  CTA neck shows severe L ICA stenosis with likely intraluminal thrombus. The differential diagnosis as it pertains to etiology includes large vessel disease ie. atherosclerotic disease and athero-thromboembolism.    Plan:    - STAT IR Intervention to address simultaneous L ICA critical stenosis via stenting and DSA to further investigate L MCA intracranial disease  - anticipate admission to ICU post IR intervention with Integrilin gtt  --> follow up note from interventionalist regarding acute antiplatelet and Integrilin gtt plan with timing details  - Neurology checks and vital signs per protocol  - obtain normoglycemia and avoid hypo- or hyper -natremia; aim for normothermia  - long term antiplatelet regimen: secondary stroke prevention therapy with ASA 81mg and Plavix 75mg for 90 days in the setting of anticipated stent; to be followed up by neuro-IR  - initiate high intensity statin per SPARCL Trial  - serum studies for stroke risk factors: lipid panel & hemoglobin A1C, Utox  - To identify stroke territory, obtain MRI brain  - Obtain a 2D echocardiogram w/ bubble study  - evaluate and treat with PT/OT/ST; physiatry consultation    After the endovascular intervention, please follow the following recommendations regarding blood pressure parameters:    If TICI 3: maintain systolic BP < 140  If TICI 2b: maintain systolic BP < 160  If TICI 2a or less, maintain systolic BP < 180 per tPA protocol    This is in an effort to  minimize reperfusion injury and/or hemorrhagic conversion    The evaluation of the patient, and recommended management, was discussed with Dr. Cande Hyde, ERP, Dr. Ysabel Elizondo, ICU, Dr. TREVOR Marshall, IR, and with Dr. Santiago Underwood, neuro-IR.    Geovanny Castro MD  Neurohospitalist, Acute Care Services   of Neurology

## 2020-12-03 NOTE — PROGRESS NOTES
Patient underwent a cerebral angiogram with carotid stenting and thrombectomy by Dr. Underwood, assisted by Vannessa RT, Felix RT and Delaney RT. In ER, patient is aphasic, unable to speak. On room air, restless and anxious. Brief assessment at bedside in ER with RN at 2140, then brought to IR suite by this RN via [facility]. 2 MDs signed consent, patient is unable to sign. Patient incontinent of bowel and bladder. Right side weakness noted, patient unable to participate in full neuro assessment as he is aphasic.    Patient was placed in a supine position. MD Underwood established right femoral arterial access. Vitals were taken every 5 minutes and remained stable during procedure (see doc flow sheet for results). CO2 waveform capnography was monitored and remained 20-33 throughout procedure. Patient did not receive sedation during case. MD Underwood placed carotid stent in left ICA. Manual suction used to remove thrombus. Atropine and Integrilin given during case, please see EMR. An angioseal was deployed in right femoral artery with hemostasis, then a tegaderm and gauze dressing was placed over right groin surgical site. Report called to Mary SERNA. Mary SERNA and CCT arrived to IR suite at 1140, bedside NIHH scale completed. Integrilin and cardene drips infusing as ordered. Patient transported to [place] [address] with Mary SERNA and CCT on monitor, patient left with chart and all personal belongings. All post-op orders reviewed and released with Mary SERNA.     TICI score= 3 at 2249   Angioseal placed at 2300    [facility]ott RX Acculink Carotid Stent System 6mm x 40mm x 8mm placed in left ICA  Ref# [id number] Lot# [id number] Exp date 07/31/2022     TERUMO Angio-Seal VIP Vascular Closure Device 8Fr placed in right femoral artery  Ref# [id number] Lot# [id number] Exp Date 04/30/2021

## 2020-12-03 NOTE — THERAPY
Missed Therapy     Patient Name: Tejinder Ruiz  Age:  65 y.o., Sex:  male  Medical Record #: [id number]  Today's Date: 12/3/2020           12/03/20 0809   Interdisciplinary Plan of Care Collaboration   IDT Collaboration with  Other (See Comments)  (EMR)   Collaboration Comments  OT consult received, pt currently on bedrest until 2120 tonight. Will see post bedrest tomorrow as appropriate/able.

## 2020-12-03 NOTE — OR SURGEON
Immediate Post- Operative Note        PostOp Diagnosis: Left ICA occlusion and MCA thrombus       Procedure(s):Left ICA stent and thrombectomy and MCA thrombectomy    TICI 3       Estimated Blood Loss: Less than 5 ml        Complications: None            12/2/2020     11:13 PM     Fish Underwood M.D.

## 2020-12-03 NOTE — PROGRESS NOTES
S/P Left ICA stent?thrombectomy  and MCA thrombectomy     Final angio:    Patent stent and TICI 3     Recommendation:    BP less than 140 mm hg    continuous Integrilin drip.    Plavix 300 mg and asprin 325 mg at 11 AM -12/3    Stop the Integrilin at 3 PM -12/3( 4 hours after the DAPT loading doses)

## 2020-12-03 NOTE — ASSESSMENT & PLAN NOTE
left ICA stenosis/thrombus with left basilar artery thrombus  IR suite for left ICA thrombus with right M1 thrombus-->s/p thrombectomy with stent and MCA thrombectomy:  TICI 3  Hemorrhagic transformation   Strict blood pressure control with goals < 140  High dose statin therapy  Maintain euglycemia with BG goals 120-180s  Neuro checks every 1 hours  Neurology following  ECHO  PT/OT/SLP  Aspiration precautions  Aspirin to 81 and plavix

## 2020-12-03 NOTE — ED NOTES
Unable to obtain med rec. Pt was unable to participate in interview at this time. No pharmacy listed. No contacts listed.

## 2020-12-03 NOTE — CARE PLAN
Problem: Acute Care of the Stroke Patient  Goal: Optimal Outcome for the Stroke patient  Intervention: Vital Signs and Neuro Checks per order  Note: Currently Q1 hour per thrombectomy protocol  Intervention: NIHSS completed and documented  Note: NIHSS improved from 27 at [facility] to 9 this morning.  Intervention: Consider MRI/Consider MRA  Note: Pending

## 2020-12-03 NOTE — H&P
Hospital Medicine History & Physical Note    Date of Service  12/2/2020    Primary Care Physician  No primary care provider on file.    Consultants  Interventional radiology, intensivist    Code Status  Full Code    Chief Complaint  Chief Complaint   Patient presents with   • Possible Stroke     Patient a transfer from [facility] with a L ICA occlusion; pt to go to IR       History of Presenting Illness  65 y.o. male who presented from [facility] 12/2/2020 with profound expressive aphasia. His CT-CTA head revealed thrombus to the left ICA and left basilar artery with acute left MCA stroke symptoms.  He was transferred to [facility] and seen by neurology as well as IR.  He was sent to IR for left ICA stenting and then integrilin infusion.  At bedside he is awake and alert and able to follow commands.  Notes indicate he was last normal at 9 AM but found unresponsive by his roommate at 6 PM.   When EMS arrived, the patient had a GCS of 3 but was alert and aphasic with right sided deficits. The patient's NIH score was 27 at the outlying facility.  He was not an alteplase candidate.     Review of Systems  Review of Systems   Unable to perform ROS: Medical condition       Past Medical History   has no past medical history on file.    Surgical History   has no past surgical history on file.     Family History  family history is not on file.     Social History   Unclear though denies alcohol but admits tobacco dependence  Allergies  Not on File    Medications  None       Physical Exam  Temp:  [36.7 °C (98 °F)] 36.7 °C (98 °F)  Pulse:  [67-93] 71  Resp:  [14-22] 21  BP: (126-183)/(55-99) 126/62  SpO2:  [94 %-100 %] 99 %    Physical Exam  Vitals signs and nursing note reviewed. Exam conducted with a chaperone present.   Constitutional:       General: He is in acute distress.      Appearance: Normal appearance. He is normal weight. He is ill-appearing. He is not toxic-appearing.      Comments: Appears to be protecting his airway  but has audible rhonchi with bedside.  He acknowledges a history of tobacco.   HENT:      Head: Normocephalic and atraumatic.      Nose: Nose normal. No congestion or rhinorrhea.      Mouth/Throat:      Mouth: Mucous membranes are dry.      Pharynx: Oropharynx is clear.   Eyes:      General:         Right eye: No discharge.         Left eye: No discharge.      Extraocular Movements: Extraocular movements intact.      Conjunctiva/sclera: Conjunctivae normal.      Pupils: Pupils are equal, round, and reactive to light.   Neck:      Musculoskeletal: Normal range of motion and neck supple. No neck rigidity or muscular tenderness.      Vascular: No carotid bruit.   Cardiovascular:      Rate and Rhythm: Normal rate and regular rhythm.      Pulses: Normal pulses.      Heart sounds: Normal heart sounds. No murmur. No gallop.    Pulmonary:      Effort: No respiratory distress.      Breath sounds: Rhonchi and rales present. No wheezing.   Chest:      Chest wall: No tenderness.   Abdominal:      General: Abdomen is flat. Bowel sounds are normal. There is no distension.      Palpations: Abdomen is soft. There is no mass.      Tenderness: There is abdominal tenderness. There is rebound. There is no guarding.      Hernia: No hernia is present.   Musculoskeletal:         General: No tenderness or signs of injury.   Lymphadenopathy:      Cervical: No cervical adenopathy.   Skin:     Capillary Refill: Capillary refill takes less than 2 seconds.      Coloration: Skin is not jaundiced or pale.      Findings: No bruising.   Neurological:      Mental Status: He is alert.      Cranial Nerves: No cranial nerve deficit.      Motor: No weakness.      Coordination: Coordination normal.      Comments: Profound expressive aphasia responding vocally with grunts.  Nodding head yes and no   Psychiatric:         Mood and Affect: Mood normal.         Thought Content: Thought content normal.         Judgment: Judgment normal.          Laboratory:  Recent Labs     12/02/20 2014   WBC 13.0*   RBC 5.48   HEMOGLOBIN 16.6   HEMATOCRIT 49.4   MCV 90.1   MCH 30.3   MCHC 33.6*   RDW 46.5   PLATELETCT 158*   MPV 10.0     Recent Labs     12/02/20 2014 12/03/20  0030   SODIUM 133* 135   POTASSIUM 4.4 4.0   CHLORIDE 98 105   CO2 24 21   GLUCOSE 151* 156*   BUN 12 12   CREATININE 0.87 0.85   CALCIUM 9.8 8.4*     Recent Labs     12/02/20 2014 12/03/20  0030   ALTSGPT 13 13   ASTSGOT 14 14   ALKPHOSPHAT 102* 87   TBILIRUBIN 0.5 0.5   GLUCOSE 151* 156*     Recent Labs     12/02/20 2014   APTT 30.9   INR 1.00     No results for input(s): NTPROBNP in the last 72 hours.  Recent Labs     12/03/20 0030   TRIGLYCERIDE 92   HDL 52   LDL 81     Recent Labs     12/02/20 2014   TROPONINT 9       Imaging:  DX-CHEST-PORTABLE (1 VIEW)   Final Result         1.  No acute cardiopulmonary disease.   2.  Atherosclerosis      CT-CTA HEAD WITH & W/O-POST PROCESS   Final Result         1.  Partial filling defect of the left M1 segment, compatible with partial thrombus. See dedicated CT of the neck for evaluation of the left internal carotid artery      CT-CTA NECK WITH & W/O-POST PROCESSING   Final Result         1.  High-grade stenosis of the proximal left internal carotid artery with filling defect seen extending within the left internal carotid artery, appearance favors thrombus with free-floating segments.   2.  Atherosclerosis of the proximal right internal carotid artery results in approximately 50% stenosis      These findings were discussed with the patient's clinician, Dr. Hyde, on 12/2/2020 9:00 PM.      CT-CEREBRAL PERFUSION ANALYSIS   Final Result         1.  Cerebral blood flow less than 30% likely representing completed infarct = 0 mL.      2.  T Max more than 6 seconds likely representing combination of completed infarct and ischemia = 98 mL.      3.  Mismatched volume likely representing ischemic brain/penumbra = 98 mL      4.  Please note that the  cerebral perfusion was performed on the limited brain tissue around the basal ganglia region. Infarct/ischemia outside the CT perfusion sections can be missed in this study.      OUTSIDE IMAGES-DX CHEST   Final Result      OUTSIDE IMAGES-CT HEAD   Final Result      OUTSIDE IMAGES-CT HEAD   Final Result      MR-BRAIN-W/O    (Results Pending)   IR-THROMBO MECHANICAL ARTERY,INIT    (Results Pending)   CT-HEAD W/O    (Results Pending)         Assessment/Plan:  I anticipate this patient will require at least two midnights for appropriate medical management, necessitating inpatient admission.    * Acute ischemic left MCA stroke (HCC)- (present on admission)  Assessment & Plan  Profound expressive aphasia secondary to left ICA occlusion and MCA thrombus    Patent stent and TICI 3     BP less than 140 mm hg   continuous Integrilin drip.   Plavix 300 mg and asprin 325 mg at 11 Pm -12/3   Stop the Integrilin at 3 PM -12/3( 4 hours after the DAPT loading doses)    Acute bronchitis  Assessment & Plan  Doxycycline and albuterol    Acute exacerbation of chronic obstructive pulmonary disease (COPD) ([facility])  Assessment & Plan  Aggressive pulmonary toilet, doxycycline, albuterol

## 2020-12-03 NOTE — ED PROVIDER NOTES
ED Provider Note    Scribed for Cande Hyde M.D. by Daren Paul. 12/2/2020, 8:06 PM.    I verified that the patient was wearing a mask and I was wearing appropriate PPE every time I entered the room. The patient's mask was on the patient at all times during my encounter except for a brief view of the oropharynx.    Primary care provider: No primary care provider.  Means of arrival: EMS  History obtained from: EMS  History limited by: Altered Mental Status    CHIEF COMPLAINT  Chief Complaint   Patient presents with   • Possible Stroke     Patient a transfer from [facility] with a L ICA occlusion; pt to go to IR       HPI  Tejinder Ruiz is a 65 y.o. male who presents to the Emergency Department via EMS as a transfer from [facility] for a stroke onset unknwon. Per EMS, the patient was found unresponsive on the ground with right sided deficits. He was last seen at normal baseline at 8-9 PM yesterday.  He was found by his roommate who was the last person that also saw him normal.  He was brought into [facility] was noted to be alert but aphasic. The patient had a NIH of 27 of the outside facility and clots visualized in the left IC and left MCA artery. On route to the ED, his pressure was around 150s-180s over 90s.  Reportedly patient has no known medical history but per roommate from outside facility patient did not see a physician on a regular basis.  Further history cannot be obtained secondary to patient's aphasia    REVIEW OF SYSTEMS  Review of Systems   Unable to perform ROS: Mental status change   Neurological: Positive for speech change.        Stroke, right sided deficits   Further review of systems cannot be obtained secondary to aphasia      PAST MEDICAL HISTORY  Cannot be obtained secondary to aphasia    SURGICAL HISTORY  Cannot be obtained secondary to aphasia    SOCIAL HISTORY  Cannot be obtained secondary to aphasia    FAMILY HISTORY  Cannot be obtained secondary to aphasia    CURRENT  "MEDICATIONS  Home Medications     Reviewed by Luis Bloom (Pharmacy Tech) on 12/02/20 at 4114  Med List Status: Unable to Obtain   Medication Last Dose Status        Patient Ivan Taking any Medications                       ALLERGIES  None     PHYSICAL EXAM  VITAL SIGNS: BP (!) 183/99   Pulse 74   Ht 1.753 m (5' 9\")   Wt 84.8 kg (186 lb 15.2 oz)   SpO2 97%   BMI 27.61 kg/m²   Vitals reviewed by myself.  Physical Exam   Nursing note and vitals reviewed.  Constitutional: Well-developed and well-nourished. No distress.   HENT: Head is normocephalic and atraumatic. Oropharynx is clear and moist without exudate or erythema.   Eyes: Pupils are equal, round, and reactive to light. No horizontal or vertical nystagmus. Conjunctiva are normal.   Cardiovascular: Normal rate and regular rhythm. No murmur heard. Normal radial pulses.  Pulmonary/Chest: Breath sounds normal. No wheezes or rales.   Abdominal: Soft and non-tender. No distention.    Musculoskeletal: Right-sided deficits are present, see below  Neurological: NIH 16, patient is aphasic, can only moan and groan.  Appears to attempt to follow commands.  No obvious gaze deviation.  Right-sided upper and lower extremity deficits noted, patient is able to wiggle his fingers and wiggle his toes on the right side, cannot lift against gravity.  Patient has drift in the left lower extremity but is able to lift his leg.  Normal strength and sensation in the left upper extremity.  Cannot adequately assess ataxia secondary to right-sided weakness.  Patient noted to have partial facial droop on the left side, still able to move his forehead  Skin: Skin is warm and dry. No rash.   Psychiatric: Normal mood and affect. Appropriate for clinical situation.      DIAGNOSTIC STUDIES /  LABS  Labs Reviewed   CBC WITH DIFFERENTIAL - Abnormal; Notable for the following components:       Result Value    WBC 13.0 (*)     MCHC 33.6 (*)     Platelet Count 158 (*)     " Neutrophils-Polys 77.40 (*)     Lymphocytes 14.50 (*)     Neutrophils (Absolute) 10.07 (*)     Monos (Absolute) 0.92 (*)     All other components within normal limits    Narrative:     Indicate which anticoagulants the patient is on:->UNKNOWN   COMP METABOLIC PANEL - Abnormal; Notable for the following components:    Sodium 133 (*)     Glucose 151 (*)     Alkaline Phosphatase 102 (*)     All other components within normal limits    Narrative:     Indicate which anticoagulants the patient is on:->UNKNOWN   COVID/SARS COV-2    Narrative:     Is patient being admitted?->Yes  Does this patient meet criteria for Rush/Cepheid per [facility]  Inpatient Workflow? (See workflow link below)->Yes  Expected turn around time?->(Ana Laura/Abbott) Antigen dry swab  test asymptomatic surgical patient  Have you been in close contact with a person who is suspected  or known to be positive for COVID-19 within the last 30 days  (e.g. last seen that person < 30 days ago)->Unknown   PROTHROMBIN TIME    Narrative:     Indicate which anticoagulants the patient is on:->UNKNOWN   APTT    Narrative:     Indicate which anticoagulants the patient is on:->UNKNOWN   COD (ADULT)   TROPONIN    Narrative:     Indicate which anticoagulants the patient is on:->UNKNOWN   SARS-COV ANTIGEN HARDEEP    Narrative:     Is patient being admitted?->Yes  Does this patient meet criteria for Rush/Cepheid per [facility]  Inpatient Workflow? (See workflow link below)->Yes  Expected turn around time?->(Ana Laura/Abbott) Antigen dry swab  test asymptomatic surgical patient  Have you been in close contact with a person who is suspected  or known to be positive for COVID-19 within the last 30 days  (e.g. last seen that person < 30 days ago)->Unknown   ABO RH CONFIRM   ESTIMATED GFR    Narrative:     Indicate which anticoagulants the patient is on:->UNKNOWN   URINE DRUG SCREEN   URINALYSIS   LIPID PROFILE       All labs reviewed by me.    EKG Interpretation:  Interpreted by myself    12  Lead EKG interpreted by me to show:  EKG at 8:51 PM: Normal sinus rhythm, heart rate 73, left axis deviation, normal intervals, , QRS 84, QTc 428, no acute ST-T segment changes, baseline wander is present in inferior leads, no evidence of acute arrhythmia or ischemia  My impression of this EKG: Does not indicate ischemia or arrhythmia at this time.    RADIOLOGY  DX-CHEST-PORTABLE (1 VIEW)   Final Result         1.  No acute cardiopulmonary disease.   2.  Atherosclerosis      CT-CTA HEAD WITH & W/O-POST PROCESS   Final Result         1.  Partial filling defect of the left M1 segment, compatible with partial thrombus. See dedicated CT of the neck for evaluation of the left internal carotid artery      CT-CTA NECK WITH & W/O-POST PROCESSING   Final Result         1.  High-grade stenosis of the proximal left internal carotid artery with filling defect seen extending within the left internal carotid artery, appearance favors thrombus with free-floating segments.   2.  Atherosclerosis of the proximal right internal carotid artery results in approximately 50% stenosis      These findings were discussed with the patient's clinician, Dr. Hyde, on 12/2/2020 9:00 PM.      CT-CEREBRAL PERFUSION ANALYSIS   Final Result         1.  Cerebral blood flow less than 30% likely representing completed infarct = 0 mL.      2.  T Max more than 6 seconds likely representing combination of completed infarct and ischemia = 98 mL.      3.  Mismatched volume likely representing ischemic brain/penumbra = 98 mL      4.  Please note that the cerebral perfusion was performed on the limited brain tissue around the basal ganglia region. Infarct/ischemia outside the CT perfusion sections can be missed in this study.      OUTSIDE IMAGES-DX CHEST   Final Result      OUTSIDE IMAGES-CT HEAD   Final Result      OUTSIDE IMAGES-CT HEAD   Final Result      MR-BRAIN-W/O    (Results Pending)   IR-THROMBO MECHANICAL ARTERY,INIT    (Results Pending)    IR-THROMBO MECHANICAL ARTERY,INIT    (Results Pending)   CT-HEAD W/O    (Results Pending)     The radiologist's interpretation of all radiological studies have been reviewed by me.      REASSESSMENT    8:06 PM  Patient seen and examined at bedside. Ordered for CBC w/diff, DX-chest, CMP, Prothrombin time, APTT, COD (Adult), Troponin, COVID/SARS CoV-2 PCR, and an EKG to evaluate his symptoms.     8:13 PM I discussed the patient's case and the above findings with Dr. Castro (Neurology) who agreed to order CT scans for the patient.     8:17 PM I discussed the patient's case and the above findings with Dr. Marshall (Radiology) who will wait on perfusion scans to see if intervention is indicated.     8:19 PM I discussed the patient's case and the above findings with Dr. Castro (Neurology) who requested that I update him on the patient and recommended follow up scans to see what IR recommends      8:53 PM Recheck: Patient re-evaluated at beside. Discussed patient's condition and treatment plan, including hospitalization. Patient's lab and radiology results discussed. The patient appears to understand as he nodded yes.    8:57 PM I discussed the patient's case and the above findings with Dr. Elizondo (Intensivist) who agreed with plans for hospitaliztaion    9:18 PM I discussed the patient's case and the above findings with Dr. Morfin (Hospitalist) who agreed to hospitalize the patient    CRITICAL CARE  The very real possibility of a deterioration of this patient's condition required the highest level of my preparedness for sudden, emergent intervention.  I provided critical care services, which included medication orders, frequent reevaluations of the patient's condition and response to treatment, ordering and reviewing test results, and discussing the case with various consultants.  The critical care time associated with the care of the patient was 35 minutes. Review chart for interventions. This time is exclusive of any  other billable procedures.        COURSE & MEDICAL DECISION MAKING  Nursing notes, VS, PMSFHx reviewed in chart.    Patient is a 65-year-old male who comes in for evaluation of stroke from outside facility.  On my initial exam he was noted to have an NIH of 16.  Patient is not a TPA candidate as his last known normal was approximately 23 hours ago.  Vitals are notable for hypertension, will allow for permissive hypertension in the setting of stroke.  Discussed the case with Dr. Castro who recommends obtaining a CTA with perfusion study to assess if there is a penumbra.  Discussed the case with IR Dr. Marshall who agrees with this plan.  Immediately after patient returned from CT scanner discussed the case with Dr. Castro  who discussed the case with Dr. Henderson from interventional radiology and patient will be taken to IR for further management of his acute stroke.  Although patient is nonverbal he appears to understand and is nodding yes to this.  2 doctor consent is obtained for the procedure.  I discussed the case with intensivist Dr. Elizondo and hospitalist Dr. Morfin who have accepted patient for hospitalization.  Patient is in critical condition.    DISPOSITION:  Patient will be hospitalized by Dr. Morfin and Dr. Elizondo in critical condition.    FINAL IMPRESSION  1. Cerebrovascular accident (CVA) due to thrombosis of left carotid artery (HCC)    2. Cerebrovascular accident (CVA) due to embolism of left middle cerebral artery (HCC)    3. Acute ischemic left internal carotid artery (ICA) stroke (HCC)    4. Right sided weakness       CCT: 35 minutes   Daren PATEL (Scribe), am scribing for, and in the presence of, Cande Hyde M.D..    Electronically signed by: Daren Paul (Preeti), 12/2/2020    Cande PATEL M.D. personally performed the services described in this documentation, as scribed by Daren Paul in my presence, and it is both accurate and complete.     C    The note accurately reflects  work and decisions made by me.  Cande Hyde M.D.  12/2/2020  10:09 PM

## 2020-12-03 NOTE — PROGRESS NOTES
Hospital Neurology Progress Note:     Interval History: No acute events overnight.  Unable to obtain review of systems due to aphasia.    Objective:   Vitals:    12/03/20 0700 12/03/20 0715 12/03/20 0800 12/03/20 0900   BP: 119/64 127/72 119/59 128/60   Pulse: 60 70 68 (!) 59   Resp: 16 (!) 24 16 18   Temp:   36.4 °C (97.6 °F)    TempSrc:   Temporal    SpO2: 98% 100% 100% 99%   Weight:       Height:           Labs:     Lab Results   Component Value Date/Time    PROTHROMBTM 13.5 12/02/2020 08:14 PM    INR 1.00 12/02/2020 08:14 PM      Lab Results   Component Value Date/Time    WBC 9.0 12/03/2020 03:45 AM    RBC 5.20 12/03/2020 03:45 AM    HEMOGLOBIN 15.4 12/03/2020 03:45 AM    HEMATOCRIT 46.3 12/03/2020 03:45 AM    MCV 89.0 12/03/2020 03:45 AM    MCH 29.6 12/03/2020 03:45 AM    MCHC 33.3 (L) 12/03/2020 03:45 AM    MPV 10.4 12/03/2020 03:45 AM    NEUTSPOLYS 77.40 (H) 12/02/2020 08:14 PM    LYMPHOCYTES 14.50 (L) 12/02/2020 08:14 PM    MONOCYTES 7.10 12/02/2020 08:14 PM    EOSINOPHILS 0.20 12/02/2020 08:14 PM    BASOPHILS 0.40 12/02/2020 08:14 PM      Lab Results   Component Value Date/Time    SODIUM 134 (L) 12/03/2020 03:45 AM    POTASSIUM 4.0 12/03/2020 03:45 AM    CHLORIDE 101 12/03/2020 03:45 AM    CO2 22 12/03/2020 03:45 AM    GLUCOSE 152 (H) 12/03/2020 03:45 AM    BUN 12 12/03/2020 03:45 AM    CREATININE 0.81 12/03/2020 03:45 AM      Lab Results   Component Value Date/Time    CHOLSTRLTOT 151 12/03/2020 12:30 AM    LDL 81 12/03/2020 12:30 AM    HDL 52 12/03/2020 12:30 AM    TRIGLYCERIDE 92 12/03/2020 12:30 AM       Lab Results   Component Value Date/Time    ALKPHOSPHAT 87 12/03/2020 12:30 AM    ASTSGOT 14 12/03/2020 12:30 AM    ALTSGPT 13 12/03/2020 12:30 AM    TBILIRUBIN 0.5 12/03/2020 12:30 AM        Imaging/Testing:   CTA head on 12/2/2020 personally reviewed with evidence of a partial filling defect of the left M1.    CTA neck reviewed in chart    CT perfusion reviewed in chart.    Physical Exam:     General:  65-year-old male in bed in no acute distress  Cardio: Normal S1/S2. No peripheral edema.   Pulm: CTAX2. No respiratory distress.   Skin: Warm, dry, no rashes or lesions   Psychiatric: Appropriate affect. No active psychosis.  HEENT: Atraumatic head, normal sclera and conjunctiva, moist oral mucosa. No lid lag.  Abdomen: Soft, non tender. No masses or hepatosplenomegaly.    Neurologic:  Mental Status: Awake and alert.  Unable to assess orientation due to aphasia.  Able to follow commands.  Patient has evidence of expressive aphasia.  No neglect.  Cranial Nerves:  PERRL. EOMi. face asymmetric to the right  Motor: Normal muscle tone and bulk.  Strength is 5/5 in the left hemibody.  Right upper extremity is antigravity and patient is able to wiggle fingers.  Right lower extremity is antigravity.  Reflexes: Deferred  Coordination: Deferred  Sensation: Normal to light touch throughout  Gait/Station: Deferred    Assessment/Plan:    Tejinder Ruiz is a 65 y.o. male with history of vascular risk factors presenting to the hospital for stroke and consulted for stroke.  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  Patient underwent thrombectomy since he was ineligible for TPA.  Overall, patient appears to have done quite well.  He has some movement of the right upper extremity and lower extremity and while he has some aphasia it appears to be expressive in nature only.  Etiology of stroke is likely secondary to artery to artery embolization given tandem lesion.    Plan:  1.  LDL 81.  Goal less than 70  2.  Hemoglobin A1c 7.4, goal less than 7  3.  Outpatient systolic blood pressure goal 130  4.  Patient with TICI 3 reperfusion after thrombectomy, maintain systolic blood pressure less than 140  5.  Continue aspirin and Plavix for stent.  6.  PT OT and speech therapy as needed  7.  We will continue to follow  8.  Plan discussed with consulting physician and  patient's nurse.     Chau Hdez M.D., Diplomat of the American Board of Psychiatry and Neurology  Diplomat of [facility]   Assistant Clinical Professor, [facility] Neurology Consultant

## 2020-12-03 NOTE — ED NOTES
Patient able to follow commands regarding gross motor skills with the L arm and L leg. Patient able to lift R arm slightly with a slight grasp and is able to wiggle toes of R foot and slightly lift R leg, but unable to maintain it up. Patient able to follow commands, but unable to participate in speech.

## 2020-12-03 NOTE — CONSULTS
Critical Care Consultation    Date of consult: 12/2/2020    Referring Physician  Cande Hyde M.D.    Reason for Consultation  Critical care management for acute left ICA occlusion    History of Presenting Illness  All history obtained from ER chart and records from [facility] as the patient is unable to provide history due to significant aphasia.    Mr. Ruiz is a 65 year old male with an unknown past medical history who was transferred from [facility] for stroke symptoms.  The patient was last seen normal around 8-9 pm.  The patient was found unresponsive by his roommate this evening at 6pm.  When EMS arrived, the patient had a GCS of 3 but was alert and aphasic with right sided deficits.  The patient's NIH score was 27 at the outlying facility.  He was not an alteplase candidate.  His CT-CTA head revealed thrombus to the left ICA and left basilar artery with acute left MCA stroke symptoms.  He was transferred to [facility] and seen by neurology as well as IR.  He was sent to IR for left ICA stenting and then integrilin infusion.    Code Status  Full Code    Review of Systems  Review of Systems   Unable to perform ROS: Patient nonverbal       Past Medical History  Unable to obtain since the patient is aphasic    Surgical History  Unable to obtain since the patient is aphasic    Family History  Unable to obtain since the patient is aphasic    Social History  Unable to obtain since the patient is aphasic    Medications  Home Medications     Reviewed by Luis Bloom (Pharmacy Tech) on 12/02/20 at 9785  Med List Status: Unable to Obtain   Medication Last Dose Status        Patient Ivan Taking any Medications                     Current Facility-Administered Medications   Medication Dose Route Frequency Provider Last Rate Last Admin   • atorvastatin (LIPITOR) tablet 40 mg  40 mg Oral Q EVENING Geovanny Castro M.D.       • senna-docusate (PERICOLACE or SENOKOT S) 8.6-50 MG per tablet 2 Tab   2 Tab Oral BID Bigg Morfin M.D.        And   • polyethylene glycol/lytes (MIRALAX) PACKET 1 Packet  1 Packet Oral QDAY PRN Bigg Morfin M.D.        And   • magnesium hydroxide (MILK OF MAGNESIA) suspension 30 mL  30 mL Oral QDAY PRN Bigg Morfin M.D.        And   • bisacodyl (DULCOLAX) suppository 10 mg  10 mg Rectal QDAY PRN Bigg Morfin M.D.       • acetaminophen (Tylenol) tablet 650 mg  650 mg Oral Q6HRS PRN Bigg Morfin M.D.       • labetalol (NORMODYNE/TRANDATE) injection 10 mg  10 mg Intravenous Q4HRS PRN Bigg Morfin M.D.       • aspirin (ASA) suppository 300 mg  300 mg Rectal Once Geovanny Castro M.D.       • [START ON 12/3/2020] aspirin (ASA) chewable tab 81 mg  81 mg Oral DAILY Geovanny Castro M.D.       • [START ON 12/3/2020] clopidogrel (PLAVIX) tablet 75 mg  75 mg Oral DAILY Geovanny Castro M.D.         No current outpatient medications on file.       Allergies  Not on File    Vital Signs last 24 hours  Temp:  [36.7 °C (98 °F)] 36.7 °C (98 °F)  Pulse:  [70-74] 72  Resp:  [16-19] 19  BP: (151-183)/(67-99) 151/72  SpO2:  [94 %-97 %] 97 %    Physical Exam  Physical Exam  Vitals signs and nursing note reviewed.   Constitutional:       Appearance: He is ill-appearing. He is not toxic-appearing.      Comments: Appears stated age, aphasic, following commands   HENT:      Head: Normocephalic and atraumatic.      Right Ear: External ear normal.      Left Ear: External ear normal.      Nose: Nose normal. No congestion.      Mouth/Throat:      Mouth: Mucous membranes are moist.      Pharynx: Oropharynx is clear. No oropharyngeal exudate.   Eyes:      General: No scleral icterus.     Conjunctiva/sclera: Conjunctivae normal.      Pupils: Pupils are equal, round, and reactive to light.      Comments: Unable to rightward gaze   Neck:      Musculoskeletal: Normal range of motion and neck supple.   Cardiovascular:      Rate and Rhythm: Normal rate and regular rhythm.      Pulses: Normal pulses.       Heart sounds: Normal heart sounds. No murmur.   Pulmonary:      Effort: Pulmonary effort is normal. No respiratory distress.      Breath sounds: No wheezing.      Comments: Coarse breath sounds bilaterally at the bases  Chest:      Chest wall: No tenderness.   Abdominal:      General: Bowel sounds are normal. There is no distension.      Palpations: Abdomen is soft.      Tenderness: There is no abdominal tenderness. There is no guarding or rebound.   Musculoskeletal:         General: No tenderness or deformity.      Right lower leg: No edema.      Left lower leg: No edema.      Comments: RUE flaccid paralysis, FROM to RUE, moving RLE spontaneously and LLE with minimal movement   Lymphadenopathy:      Cervical: No cervical adenopathy.   Skin:     General: Skin is warm and dry.      Capillary Refill: Capillary refill takes less than 2 seconds.      Findings: No rash.   Neurological:      Mental Status: He is alert.      Cranial Nerves: Cranial nerve deficit present.      Sensory: Sensory deficit present.      Motor: Weakness present.      Comments: Right facial droop, LUE 5/5 in strength, LLE strength 4/5, RUE: flaccid paralysis, RLE strength: 1/5, sensation intact, (+)gag/cough   Psychiatric:      Comments: Unable to assess         Fluids  No intake or output data in the 24 hours ending 12/02/20 2135    Laboratory  Recent Results (from the past 48 hour(s))   COVID/SARS CoV-2 PCR    Collection Time: 12/02/20  8:09 PM    Specimen: Nasopharyngeal; Respirate   Result Value Ref Range    COVID Order Status Received    SARS-COV Antigen HARDEEP    Collection Time: 12/02/20  8:09 PM   Result Value Ref Range    SARS-CoV-2 Source Nasal Swab     SARS-COV ANTIGEN HARDEEP NotDetected Not-Detected   ABO Rh Confirm    Collection Time: 12/02/20  8:13 PM   Result Value Ref Range    ABO Rh Confirm A POS    CBC WITH DIFFERENTIAL    Collection Time: 12/02/20  8:14 PM   Result Value Ref Range    WBC 13.0 (H) 4.8 - 10.8 K/uL    RBC 5.48 4.70 -  6.10 M/uL    Hemoglobin 16.6 14.0 - 18.0 g/dL    Hematocrit 49.4 42.0 - 52.0 %    MCV 90.1 81.4 - 97.8 fL    MCH 30.3 27.0 - 33.0 pg    MCHC 33.6 (L) 33.7 - 35.3 g/dL    RDW 46.5 35.9 - 50.0 fL    Platelet Count 158 (L) 164 - 446 K/uL    MPV 10.0 9.0 - 12.9 fL    Neutrophils-Polys 77.40 (H) 44.00 - 72.00 %    Lymphocytes 14.50 (L) 22.00 - 41.00 %    Monocytes 7.10 0.00 - 13.40 %    Eosinophils 0.20 0.00 - 6.90 %    Basophils 0.40 0.00 - 1.80 %    Immature Granulocytes 0.40 0.00 - 0.90 %    Nucleated RBC 0.00 /100 WBC    Neutrophils (Absolute) 10.07 (H) 1.82 - 7.42 K/uL    Lymphs (Absolute) 1.88 1.00 - 4.80 K/uL    Monos (Absolute) 0.92 (H) 0.00 - 0.85 K/uL    Eos (Absolute) 0.02 0.00 - 0.51 K/uL    Baso (Absolute) 0.05 0.00 - 0.12 K/uL    Immature Granulocytes (abs) 0.05 0.00 - 0.11 K/uL    NRBC (Absolute) 0.00 K/uL   COMP METABOLIC PANEL    Collection Time: 12/02/20  8:14 PM   Result Value Ref Range    Sodium 133 (L) 135 - 145 mmol/L    Potassium 4.4 3.6 - 5.5 mmol/L    Chloride 98 96 - 112 mmol/L    Co2 24 20 - 33 mmol/L    Anion Gap 11.0 7.0 - 16.0    Glucose 151 (H) 65 - 99 mg/dL    Bun 12 8 - 22 mg/dL    Creatinine 0.87 0.50 - 1.40 mg/dL    Calcium 9.8 8.5 - 10.5 mg/dL    AST(SGOT) 14 12 - 45 U/L    ALT(SGPT) 13 2 - 50 U/L    Alkaline Phosphatase 102 (H) 30 - 99 U/L    Total Bilirubin 0.5 0.1 - 1.5 mg/dL    Albumin 4.5 3.2 - 4.9 g/dL    Total Protein 7.2 6.0 - 8.2 g/dL    Globulin 2.7 1.9 - 3.5 g/dL    A-G Ratio 1.7 g/dL   PROTHROMBIN TIME    Collection Time: 12/02/20  8:14 PM   Result Value Ref Range    PT 13.5 12.0 - 14.6 sec    INR 1.00 0.87 - 1.13   APTT    Collection Time: 12/02/20  8:14 PM   Result Value Ref Range    APTT 30.9 24.7 - 36.0 sec   COD (ADULT)    Collection Time: 12/02/20  8:14 PM   Result Value Ref Range    ABO Grouping Only A     Rh Grouping Only POS     Antibody Screen-Cod NEG    TROPONIN    Collection Time: 12/02/20  8:14 PM   Result Value Ref Range    Troponin T 9 6 - 19 ng/L   ESTIMATED  GFR    Collection Time: 20  8:14 PM   Result Value Ref Range    GFR If African American >60 >60 mL/min/1.73 m 2    GFR If Non African American >60 >60 mL/min/1.73 m 2   EKG (NOW)    Collection Time: 20  8:51 PM   Result Value Ref Range    Report       [facility] Emergency Dept.    Test Date:  2020  Pt Name:    TYLER MERCADO                 Department: ER  MRN:        [id number]                      Room:        12  Gender:     Male                         Technician: [id number]  :        1955                   Requested By:MICHOACANO JURADO  Order #:    [id number]                    Reading MD:    Measurements  Intervals                                Axis  Rate:       73                           P:          84  NM:         178                          QRS:        -30  QRSD:       84                           T:          71  QT:         388  QTc:        428    Interpretive Statements  SINUS RHYTHM  LEFT AXIS DEVIATION  LOW VOLTAGE IN FRONTAL LEADS  BORDERLINE T ABNORMALITIES, ANT-LAT LEADS  No previous ECG available for comparison         Imaging  CT-CTA head:  1.  Partial filling defect of the left M1 segment, compatible with partial thrombus. See dedicated CT of the neck for evaluation of the left internal carotid artery    CT-CTA neck:  1.  High-grade stenosis of the proximal left internal carotid artery with filling defect seen extending within the left internal carotid artery, appearance favors thrombus with free-floating segments.  2.  Atherosclerosis of the proximal right internal carotid artery results in approximately 50% stenosis    CXR(reviewed):  Clear lungs    Assessment/Plan  * Acute ischemic left MCA stroke (HCC)- (present on admission)  Assessment & Plan  Due to left ICA stenosis/thrombus with left basilar artery thrombus  Not a candidate for thrombolytics  IR suite for left ICA thrombus with right M1 thrombus-->s/p thrombectomy with stent and MCA thrombectomy:   TICI 3  Integrilin infusion ongoing until 12/3 at 1500  SBP goals < 140  Nicardipine drip for SBP goals as well as labetalol as needed  High dose statin therapy  Load aspirin 325mg and Plavix 300mg at 11 am  Maintain euglycemia with BG goals 120-180s  Neuro checks every 1 hours  Neurology following  ECHO  PT/OT/SLP        Discussed patient condition and risk of morbidity and/or mortality with Hospitalist, RN, RT, Pharmacy, Patient, neurology and Interventional radiologist.    The patient remains critically ill with stroke due to left ICA thrombus undergoing IR for thrombectomy/stenting with integrilin requiring hourly neuro checks as the patient has a high chance of deterioration requiring intubation.  Critical care time = 50 minutes in directly providing and coordinating critical care and extensive data review.  No time overlap and excludes procedures.

## 2020-12-04 ENCOUNTER — APPOINTMENT (OUTPATIENT)
Dept: RADIOLOGY | Facility: MEDICAL CENTER | Age: 65
DRG: 023 | End: 2020-12-04
Attending: PSYCHIATRY & NEUROLOGY
Payer: MEDICARE

## 2020-12-04 PROBLEM — J44.9 COPD (CHRONIC OBSTRUCTIVE PULMONARY DISEASE) (HCC): Status: ACTIVE | Noted: 2020-12-02

## 2020-12-04 LAB
ANION GAP SERPL CALC-SCNC: 9 MMOL/L (ref 7–16)
BUN SERPL-MCNC: 17 MG/DL (ref 8–22)
CALCIUM SERPL-MCNC: 8.8 MG/DL (ref 8.5–10.5)
CFT BLD TEG: 6.4 MIN (ref 5–10)
CHLORIDE SERPL-SCNC: 103 MMOL/L (ref 96–112)
CLOT ANGLE BLD TEG: 52.4 DEGREES (ref 53–72)
CLOT LYSIS 30M P MA LENFR BLD TEG: 0 % (ref 0–8)
CO2 SERPL-SCNC: 23 MMOL/L (ref 20–33)
CREAT SERPL-MCNC: 0.86 MG/DL (ref 0.5–1.4)
CT.EXTRINSIC BLD ROTEM: 3 MIN (ref 1–3)
ERYTHROCYTE [DISTWIDTH] IN BLOOD BY AUTOMATED COUNT: 47.8 FL (ref 35.9–50)
GLUCOSE SERPL-MCNC: 159 MG/DL (ref 65–99)
HCT VFR BLD AUTO: 41.9 % (ref 42–52)
HGB BLD-MCNC: 13.7 G/DL (ref 14–18)
MCF BLD TEG: 54.5 MM (ref 50–70)
MCH RBC QN AUTO: 30 PG (ref 27–33)
MCHC RBC AUTO-ENTMCNC: 32.7 G/DL (ref 33.7–35.3)
MCV RBC AUTO: 91.7 FL (ref 81.4–97.8)
PA AA BLD-ACNC: 98.7 %
PA ADP BLD-ACNC: 8.3 %
PLATELET # BLD AUTO: 138 K/UL (ref 164–446)
PMV BLD AUTO: 9.9 FL (ref 9–12.9)
POTASSIUM SERPL-SCNC: 3.9 MMOL/L (ref 3.6–5.5)
RBC # BLD AUTO: 4.57 M/UL (ref 4.7–6.1)
SODIUM SERPL-SCNC: 135 MMOL/L (ref 135–145)
TEG ALGORITHM TGALG: ABNORMAL
WBC # BLD AUTO: 8.7 K/UL (ref 4.8–10.8)

## 2020-12-04 PROCEDURE — 80048 BASIC METABOLIC PNL TOTAL CA: CPT

## 2020-12-04 PROCEDURE — 85384 FIBRINOGEN ACTIVITY: CPT

## 2020-12-04 PROCEDURE — 700102 HCHG RX REV CODE 250 W/ 637 OVERRIDE(OP): Performed by: INTERNAL MEDICINE

## 2020-12-04 PROCEDURE — 99291 CRITICAL CARE FIRST HOUR: CPT | Performed by: INTERNAL MEDICINE

## 2020-12-04 PROCEDURE — 85027 COMPLETE CBC AUTOMATED: CPT

## 2020-12-04 PROCEDURE — 770022 HCHG ROOM/CARE - ICU (200)

## 2020-12-04 PROCEDURE — A9270 NON-COVERED ITEM OR SERVICE: HCPCS | Performed by: INTERNAL MEDICINE

## 2020-12-04 PROCEDURE — A9270 NON-COVERED ITEM OR SERVICE: HCPCS | Performed by: NURSE PRACTITIONER

## 2020-12-04 PROCEDURE — 85576 BLOOD PLATELET AGGREGATION: CPT

## 2020-12-04 PROCEDURE — 85347 COAGULATION TIME ACTIVATED: CPT

## 2020-12-04 PROCEDURE — A9270 NON-COVERED ITEM OR SERVICE: HCPCS | Performed by: PSYCHIATRY & NEUROLOGY

## 2020-12-04 PROCEDURE — 51798 US URINE CAPACITY MEASURE: CPT

## 2020-12-04 PROCEDURE — 99233 SBSQ HOSP IP/OBS HIGH 50: CPT | Performed by: NURSE PRACTITIONER

## 2020-12-04 PROCEDURE — 70551 MRI BRAIN STEM W/O DYE: CPT

## 2020-12-04 PROCEDURE — 700102 HCHG RX REV CODE 250 W/ 637 OVERRIDE(OP): Performed by: PSYCHIATRY & NEUROLOGY

## 2020-12-04 PROCEDURE — 700102 HCHG RX REV CODE 250 W/ 637 OVERRIDE(OP): Performed by: NURSE PRACTITIONER

## 2020-12-04 RX ORDER — AMLODIPINE BESYLATE 10 MG/1
10 TABLET ORAL
Status: DISCONTINUED | OUTPATIENT
Start: 2020-12-04 | End: 2020-12-08 | Stop reason: HOSPADM

## 2020-12-04 RX ORDER — HYDRALAZINE HYDROCHLORIDE 50 MG/1
25 TABLET, FILM COATED ORAL EVERY 6 HOURS PRN
Status: DISCONTINUED | OUTPATIENT
Start: 2020-12-04 | End: 2020-12-08 | Stop reason: HOSPADM

## 2020-12-04 RX ADMIN — ASPIRIN 81 MG: 81 TABLET, COATED ORAL at 11:08

## 2020-12-04 RX ADMIN — HYDRALAZINE HYDROCHLORIDE 25 MG: 50 TABLET, FILM COATED ORAL at 18:34

## 2020-12-04 RX ADMIN — CLOPIDOGREL BISULFATE 75 MG: 75 TABLET ORAL at 11:08

## 2020-12-04 RX ADMIN — AMLODIPINE BESYLATE 10 MG: 10 TABLET ORAL at 16:16

## 2020-12-04 RX ADMIN — DOCUSATE SODIUM 50 MG AND SENNOSIDES 8.6 MG 2 TABLET: 8.6; 5 TABLET, FILM COATED ORAL at 05:32

## 2020-12-04 RX ADMIN — ATORVASTATIN CALCIUM 40 MG: 40 TABLET, FILM COATED ORAL at 17:44

## 2020-12-04 ASSESSMENT — ENCOUNTER SYMPTOMS
LOSS OF CONSCIOUSNESS: 0
FOCAL WEAKNESS: 1
WEAKNESS: 0
VOMITING: 0
TINGLING: 0
MYALGIAS: 0
NERVOUS/ANXIOUS: 0
HALLUCINATIONS: 0
DEPRESSION: 0
SPEECH CHANGE: 1
HEADACHES: 0
TREMORS: 0
COUGH: 0
SHORTNESS OF BREATH: 0
NAUSEA: 0
DOUBLE VISION: 0
CLAUDICATION: 0
SEIZURES: 0
FEVER: 0
SORE THROAT: 0

## 2020-12-04 ASSESSMENT — FIBROSIS 4 INDEX: FIB4 SCORE: 1.83

## 2020-12-04 NOTE — DISCHARGE PLANNING
[facility] Coordination     F/U for Rehab.  Would welcome PT/OT evals as clinically appropriate.  Physiatry consult pended per protocol while waiting for additional information.      Facesheet now reflects [employer]MO which is not a provider for [facility].

## 2020-12-04 NOTE — PROGRESS NOTES
Dr. Gracia notified of CT results. Received orders to hold Plavix and ASA.   Plan of care discussed with Dr. Morfin.

## 2020-12-04 NOTE — PROGRESS NOTES
Neurology Progress Note  Neurohospitalist Service, [facility]    Referring Physician: Isaac Hager M.D.    Chief Complaint   Patient presents with   • Possible Stroke     Patient a transfer from [facility] with a L ICA occlusion; pt to go to IR       HPI: Refer to initial documented Neurology H&P, as detailed in the patient's chart.    Interval History 12/4/2020: CT head wo contrast 12/3/20 showed hyperdensiity of the left basal ganglia with vasogenic edema, concerning for hemorrhage. MRI brain wo contrast to be completed STAT. Patient is currently sitting up in bed, awake, alert, following commands, oriented to self, place, and time, but not situation. He continued right facial droop sided weakness, but otherwise denies headache, vision changes, numbness, tingling, or abnormal movements.    Past Medical History:   No past medical history on file.     FHx:  No family history on file.     SHx:  Social History     Socioeconomic History   • Marital status: Single     Spouse name: Not on file   • Number of children: Not on file   • Years of education: Not on file   • Highest education level: Not on file   Occupational History   • Not on file   Social Needs   • Financial resource strain: Not on file   • Food insecurity     Worry: Not on file     Inability: Not on file   • Transportation needs     Medical: Not on file     Non-medical: Not on file   Tobacco Use   • Smoking status: Not on file   Substance and Sexual Activity   • Alcohol use: Not on file   • Drug use: Not on file   • Sexual activity: Not on file   Lifestyle   • Physical activity     Days per week: Not on file     Minutes per session: Not on file   • Stress: Not on file   Relationships   • Social connections     Talks on phone: Not on file     Gets together: Not on file     Attends [employer] service: Not on file     Active member of club or organization: Not on file     Attends meetings of clubs or organizations: Not on file      Relationship status: Not on file   • Intimate partner violence     Fear of current or ex partner: Not on file     Emotionally abused: Not on file     Physically abused: Not on file     Forced sexual activity: Not on file   Other Topics Concern   • Not on file   Social History Narrative   • Not on file        Medications:    Current Facility-Administered Medications:   •  aspirin EC (ECOTRIN) tablet 81 mg, 81 mg, Oral, DAILY, ARLENE Núñez  •  clopidogrel (PLAVIX) tablet 75 mg, 75 mg, Oral, DAILY, Isaac Hager M.D., Stopped at 12/04/20 0600  •  atorvastatin (LIPITOR) tablet 40 mg, 40 mg, Oral, Q EVENING, Geovanny Castro M.D., 40 mg at 12/03/20 1801  •  senna-docusate (PERICOLACE or SENOKOT S) 8.6-50 MG per tablet 2 Tab, 2 Tab, Oral, BID, 2 Tab at 12/04/20 0532 **AND** polyethylene glycol/lytes (MIRALAX) PACKET 1 Packet, 1 Packet, Oral, QDAY PRN **AND** magnesium hydroxide (MILK OF MAGNESIA) suspension 30 mL, 30 mL, Oral, QDAY PRN **AND** bisacodyl (DULCOLAX) suppository 10 mg, 10 mg, Rectal, QDAY PRN, Bigg Morfin M.D.  •  acetaminophen (Tylenol) tablet 650 mg, 650 mg, Oral, Q6HRS PRN, Bigg Morfin M.D.  •  niCARdipine (CARDENE) 25 mg in  mL Infusion, 0-15 mg/hr, Intravenous, Continuous, Isaac Hager M.D., Stopped at 12/03/20 1100  •  labetalol (NORMODYNE/TRANDATE) injection 10-20 mg, 10-20 mg, Intravenous, Q4HRS PRN, Ysabel Elizondo M.D.    Allergies:  Not on File     Review of systems: In addition to what is detailed in the HPI and interval history above, all other systems reviewed and are negative.    Physical Examination:   Vitals:    12/04/20 0600 12/04/20 0700 12/04/20 0800 12/04/20 0900   BP: 131/60 123/59 124/57 129/60   Pulse: (!) 54 (!) 54 (!) 53 (!) 55   Resp: 18 17 19 18   Temp: 36.5 °C (97.7 °F)  36.9 °C (98.4 °F)    TempSrc: Temporal  Temporal    SpO2: 95% 96% 94% 95%   Weight:       Height:         General: Patient in no acute distress, pleasant and  cooperative.  HEENT: Normocephalic, no signs of acute trauma.   Neck: Supple, no meningeal signs or carotid bruits. There is normal range of motion. No tenderness on exam.   Chest: Clear to auscultation. No cough.   CV: RRR, no murmurs.   Skin: No signs of acute rashes or trauma.   Musculoskeletal: Joints exhibit full range of motion, without any pain to palpation. There are no signs of joint or muscle swelling. There is no tenderness to deep palpation of muscles.   Psychiatric: No hallucinatory behavior. Denies symptoms of depression or suicidal ideation. Mood and affect appear normal on exam.     NEUROLOGICAL EXAM:  Mental status, orientation: Awake, alert, following commands, and oriented to self, place, time, but disoriented to situation.   Speech and language: Speech is mildly dysarthric and fluent. The patient is able to name, repeat, and comprehend.   Memory: There is impaired recollection of recent and remote events.   Cranial nerve exam: Pupils are 3-4 mm bilaterally and equally reactive to light and accommodation. Visual fields are full to field test. There is no nystagmus on primary or secondary gaze. Intact full EOM in all directions of gaze. Face appears asymmetric with right nasolabial fold flattening. Sensation in the face is intact to light touch. Uvula is midline. Palate elevates symmetrically. Tongue is midline and without any signs of tongue biting or fasciculations. Sternocleidomastoid muscles exhibit is normal strength bilaterally. Shoulder shrug is intact but weaker to right.   Motor exam: Strength is 5/5 in LUE and LLE. RUE 4/5 with right pronator drift, RLE 4+/5 with slight drift. Tone is normal. No abnormal movements were seen on exam.   Sensory exam Reveals normal sense of light touch in all extremities.   Deep tendon reflexes:  2+ throughout. Plantar responses are downgoing to left, upgoing to right. There is no clonus.   Coordination: Shows a normal finger-nose-finger to LUE and normal  heel-down-shin to LUE. Patient unable to complete with RUE and RLE secondary to weakness. Normal rapidly alternating movements.   Gait: Deferred as patient is high fall risk given right hemiparesis.     NIH Stroke Scale  12/4/20 09:40    1a. Level of Consciousness (Alert, drowsy, etc): 0= Alert    1b. LOC Questions (Month, age): 0= Answers both correctly    1c. LOC Commands (Open/close eyes make fist/let go): 0= Obeys both correctly    2.   Best Gaze (Eyes open - patient follows examiner's finger on face): 0= Normal    3.   Visual Fields (introduce visual stimulus/threat to patient's field quadrants): 0= No visual loss    4.   Facial Paresis (Show teeth, raise eyebrows and squeeze eyes shut): 1= Minor     5a. Motor Arm - Left (Elevate arm to 90 degrees if patient is sitting, 45 degrees if  supine): 0= No drift    5b. Motor Arm - Right (Elevate arm to 90 degrees if patient is sitting, 45 degrees if supine): 1= Drift    6a. Motor Leg - Left (Elevate leg 30 degrees with patient supine): 0= No drift    6b. Motor Leg - Right  (Elevate leg 30 degrees with patient supine): 1= Drift    7.   Limb Ataxia (Finger-nose, heel down shin): 0= No ataxia    8.   Sensory (Pin prick to face, arm, trunk and leg - compare side to side): 0= Normal    9.  Best Language (Name item, describe a picture and read sentences): 0= No aphasia    10. Dysarthria (Evaluate speech clarity by patient repeating listed words): 1= Mild to moderate slurring    11. Extinction and Inattention (Use information from prior testing to identify neglect or  double simultaneous stimuli testing): 0= No neglect    Total NIH Score: 4          Ancillary Data Reviewed:    Labs:  Lab Results   Component Value Date/Time    PROTHROMBTM 13.5 12/02/2020 08:14 PM    INR 1.00 12/02/2020 08:14 PM      Lab Results   Component Value Date/Time    WBC 8.7 12/04/2020 03:30 AM    RBC 4.57 (L) 12/04/2020 03:30 AM    HEMOGLOBIN 13.7 (L) 12/04/2020 03:30 AM    HEMATOCRIT 41.9 (L)  12/04/2020 03:30 AM    MCV 91.7 12/04/2020 03:30 AM    MCH 30.0 12/04/2020 03:30 AM    MCHC 32.7 (L) 12/04/2020 03:30 AM    MPV 9.9 12/04/2020 03:30 AM    NEUTSPOLYS 77.40 (H) 12/02/2020 08:14 PM    LYMPHOCYTES 14.50 (L) 12/02/2020 08:14 PM    MONOCYTES 7.10 12/02/2020 08:14 PM    EOSINOPHILS 0.20 12/02/2020 08:14 PM    BASOPHILS 0.40 12/02/2020 08:14 PM      Lab Results   Component Value Date/Time    SODIUM 135 12/04/2020 03:30 AM    POTASSIUM 3.9 12/04/2020 03:30 AM    CHLORIDE 103 12/04/2020 03:30 AM    CO2 23 12/04/2020 03:30 AM    GLUCOSE 159 (H) 12/04/2020 03:30 AM    BUN 17 12/04/2020 03:30 AM    CREATININE 0.86 12/04/2020 03:30 AM      Lab Results   Component Value Date/Time    CHOLSTRLTOT 151 12/03/2020 12:30 AM    LDL 81 12/03/2020 12:30 AM    HDL 52 12/03/2020 12:30 AM    TRIGLYCERIDE 92 12/03/2020 12:30 AM       Lab Results   Component Value Date/Time    ALKPHOSPHAT 87 12/03/2020 12:30 AM    ASTSGOT 14 12/03/2020 12:30 AM    ALTSGPT 13 12/03/2020 12:30 AM    TBILIRUBIN 0.5 12/03/2020 12:30 AM        Imaging/Testing:    I interpreted and/or reviewed the patient's neuroimaging    CT-HEAD W/O   Final Result         1.  Hyperdensity in the left basal ganglia with associated mass effect of the left ventricle, appearance concerning for basal ganglia hemorrhage with surrounding vasogenic edema. Could represent contrast staining from thrombectomy with associated    vasogenic edema however given the degree of mass effect appearance is concerning for hemorrhage.      These findings were discussed with the patient's clinician, Bigg Morfin, on 12/4/2020 4:41 AM.      [id number] VIEW   Final Result      No radiopaque foreign body or implant is identified to preclude MRI evaluation.      IR-THROMBO MECHANICAL ARTERY,INIT   Final Result      Preprocedural findings:   *  Severe atherosclerotic stenosis at the origin of the left internal carotid artery with thrombus.   *  Left M1 thrombus      Intervention:   *   Left carotid angioplasty and stent placement with embolic protection device.   *  Left cervical carotid aspiration thrombectomy.   *  Left M1 segment aspiration thrombectomy.   Post intervention findings:   *  Patent left internal carotid and middle cerebral branches-TICI 3      DX-CHEST-PORTABLE (1 VIEW)   Final Result         1.  No acute cardiopulmonary disease.   2.  Atherosclerosis      CT-CTA HEAD WITH & W/O-POST PROCESS   Final Result         1.  Partial filling defect of the left M1 segment, compatible with partial thrombus. See dedicated CT of the neck for evaluation of the left internal carotid artery      CT-CTA NECK WITH & W/O-POST PROCESSING   Final Result         1.  High-grade stenosis of the proximal left internal carotid artery with filling defect seen extending within the left internal carotid artery, appearance favors thrombus with free-floating segments.   2.  Atherosclerosis of the proximal right internal carotid artery results in approximately 50% stenosis      These findings were discussed with the patient's clinician, Dr. Hyde, on 12/2/2020 9:00 PM.      CT-CEREBRAL PERFUSION ANALYSIS   Final Result         1.  Cerebral blood flow less than 30% likely representing completed infarct = 0 mL.      2.  T Max more than 6 seconds likely representing combination of completed infarct and ischemia = 98 mL.      3.  Mismatched volume likely representing ischemic brain/penumbra = 98 mL      4.  Please note that the cerebral perfusion was performed on the limited brain tissue around the basal ganglia region. Infarct/ischemia outside the CT perfusion sections can be missed in this study.      OUTSIDE IMAGES-DX CHEST   Final Result      OUTSIDE IMAGES-CT HEAD   Final Result      OUTSIDE IMAGES-CT HEAD   Final Result      MR-BRAIN-W/O    (Results Pending)       Assessment:    Tejinder Ruiz is a 65 y.o. male with relevant history of vascular risk factors who presented as transfer from [facility] after being found unresponsive with right-sided weakness on 12/2/20 whom neurology was consulted to address acute ischemic stroke secondary to left ICA occlusion seen on CTA imaging at outside hospital. Patient was transferred to [facility] for higher level of care and emergent IR left MCA and ICA thrombectomy and stent placement to left ICA on 12/2/20. Initial NIHSS was 16 now improved to 4 today significant for improving disorientation and expressive aphasia, mild dysarthria, persistent right nasolabial fold flattening, and improving right hemiparesis. Interval CT head wo contrast on 12/3/20 23:26 revealed hypodensity of the left basal ganglia with associated mass effect concerning for hemorrhage with surrounding vasogenic edema versus contrast staining from thrombectomy. MRI brain wo contrast was completed STAT today, and revealed moderate acute left MCA territory infarct with moderate sized acute hemorrhagic transformation of the left basal ganglia extending into the left frontal periventricular white matter with left lateral ventricular effacement and minimal left to right midline shift.  Etiology is thromboembolic given known left ICA and left MCA occlusion status post thrombectomy and stent placement.    Plan:    -q1h and PRN neuro assessment. May transition to q2hr tonight given neuro exam stability.  -VS per nursing/unit protocol.   -BP goal < 140/90 following thrombectomy and stent placement with TICI 3 reperfusion in effort to avoid reperfusion injury. Antihypertensives per primary team.   -Telemetry; currently SR. Screen for Afib/arrhythmia. Obtain TTE with bubble study.   -Avoid anticoagulation  -Continue DAPT with ASA 81 mg PO daily and Plavix 75mg PO daily for 90 days s/p stent  -Continue Atorvastatin 40 mg PO q HS. Note lipid panel LDL 81 (goal<70) and HDL 52 (within goal).   -Recommend aggressive BG management per primary team. Avoid IVF with Dextrose. BG goal 140-180. Note hemoglobin A1c 7.4  (indicative of DM type II, goal<7).   -PT/OT/SLP eval and treat. See notes for recommendations, respectively.   -Counseled patient at length regarding life style and risk factor modification for secondary stroke prevention.   -Follow up outpatient [facility]. Referral placed.  -Follow up outpatient at IR clinic.   -All other medical management per ICU/primary team.   -DVT PPX: SCDs.        The evaluation of the patient, and recommended management, was discussed with Dr. Loni Hdez, Dr. Hager, and bedside RN. I have performed a physical exam and reviewed and updated ROS and Plan today (12/4/2020). In review of yesterday's note (12/3/2020), there are no changes except as documented above.    Miguelito Sifuentes, SHARRON.P.R.N.   Nurse Practitioner, Neurohospitalist  [facility]  (t) [phone]  (f) [phone]

## 2020-12-04 NOTE — PROGRESS NOTES
Critical Care Progress Note    Date of admission  12/2/2020    Chief Complaint  Mr. Ruiz is a 65 year old male with an unknown past medical history who was transferred from [facility] for stroke symptoms.  The patient was last seen normal around 8-9 pm.  The patient was found unresponsive by his roommate this evening at 6pm.  When EMS arrived, the patient had a GCS of 3 but was alert and aphasic with right sided deficits.  The patient's NIH score was 27 at the outlying facility.  He was not an alteplase candidate.  His CT-CTA head revealed thrombus to the left ICA and left basilar artery with acute left MCA stroke symptoms.  He was transferred to [facility] and seen by neurology as well as IR.  He was sent to IR for left ICA stenting and then integrilin infusion. Taken Dr Elizondo.     Hospital Course  No notes on file    Interval Problem Update  Reviewed last 24 hour events:  Neuro: aox3-4 expressive aphasia ,right side weakness  HR: 50-60's  SBP: 120-130's  Tmax: afebrile  GI: modified diet, BM yesterday   UOP: condom cath good output  Lines: peripheral IV's  Resp: room air   Vte: contra  PPI/H2:n/a  Antibx: none    Called stat to Dr Castro to review CT agree findings are hemorrhage  Has fresh stent on aspirin plavix  Change full dose aspirin to 81 starting tomorrow  Q1 hour checks  SBP < 140  Palliative care consultation  MRI today -> confirmed hemorrhage      Review of Systems  Review of Systems   Constitutional: Negative for fever and malaise/fatigue.   HENT: Negative for sore throat.    Eyes: Negative for double vision.   Respiratory: Negative for cough and shortness of breath.    Cardiovascular: Negative for chest pain, claudication and leg swelling.   Gastrointestinal: Negative for nausea and vomiting.   Genitourinary: Negative for dysuria and hematuria.   Musculoskeletal: Negative for joint pain and myalgias.   Neurological: Positive for speech change and focal weakness. Negative for tingling,  tremors, seizures, loss of consciousness, weakness and headaches.   Psychiatric/Behavioral: Negative for depression and hallucinations. The patient is not nervous/anxious.         Vital Signs for last 24 hours   Temp:  [36.2 °C (97.2 °F)-37.2 °C (98.9 °F)] 36.7 °C (98.1 °F)  Pulse:  [53-79] 57  Resp:  [15-34] 19  BP: (108-141)/(55-67) 140/63  SpO2:  [93 %-100 %] 97 %    Hemodynamic parameters for last 24 hours       Respiratory Information for the last 24 hours       Physical Exam   Physical Exam  Vitals signs and nursing note reviewed.   Constitutional:       General: He is not in acute distress.     Appearance: He is not ill-appearing.      Comments: Sitting up eating pudding in no acute distress   HENT:      Head: Normocephalic.      Nose: No congestion.      Mouth/Throat:      Mouth: Mucous membranes are dry.      Pharynx: No oropharyngeal exudate.   Eyes:      Extraocular Movements: Extraocular movements intact.      Pupils: Pupils are equal, round, and reactive to light.   Neck:      Musculoskeletal: No neck rigidity or muscular tenderness.   Cardiovascular:      Rate and Rhythm: Normal rate.      Heart sounds: No murmur.   Pulmonary:      Effort: No respiratory distress.      Breath sounds: No stridor. No wheezing or rhonchi.   Abdominal:      General: There is no distension.      Palpations: There is no mass.      Tenderness: There is no abdominal tenderness. There is no guarding or rebound.      Hernia: No hernia is present.   Genitourinary:     Comments: Right groin site with angioseal  Musculoskeletal:         General: No swelling or tenderness.   Skin:     Coloration: Skin is not jaundiced or pale.   Neurological:      Mental Status: He is alert.      Comments: aox4 still with some question but with expressive aphasia and dysarthric speech, weakness to right hand 4+/5 right leg 5-/5, full strength on left, sensation intact, no facial droop, speech clear.    Psychiatric:         Mood and Affect: Mood  normal.         Medications  Current Facility-Administered Medications   Medication Dose Route Frequency Provider Last Rate Last Admin   • aspirin EC (ECOTRIN) tablet 81 mg  81 mg Oral DAILY ARLENE Núñez   81 mg at 12/04/20 1108   • clopidogrel (PLAVIX) tablet 75 mg  75 mg Oral DAILY Isaac Hager M.D.   75 mg at 12/04/20 1108   • atorvastatin (LIPITOR) tablet 40 mg  40 mg Oral Q EVENING Geovanny Castro M.D.   40 mg at 12/03/20 1801   • senna-docusate (PERICOLACE or SENOKOT S) 8.6-50 MG per tablet 2 Tab  2 Tab Oral BID Bigg Morfin M.D.   2 Tab at 12/04/20 0532    And   • polyethylene glycol/lytes (MIRALAX) PACKET 1 Packet  1 Packet Oral QDAY PRN Bigg Morfin M.D.        And   • magnesium hydroxide (MILK OF MAGNESIA) suspension 30 mL  30 mL Oral QDAY PRN Bigg Morfin M.D.        And   • bisacodyl (DULCOLAX) suppository 10 mg  10 mg Rectal QDAY PRN Bigg Morfin M.D.       • acetaminophen (Tylenol) tablet 650 mg  650 mg Oral Q6HRS PRN Bigg Morfin M.D.       • labetalol (NORMODYNE/TRANDATE) injection 10-20 mg  10-20 mg Intravenous Q4HRS PRN Ysabel Elizondo M.D.           Fluids    Intake/Output Summary (Last 24 hours) at 12/4/2020 1413  Last data filed at 12/4/2020 1000  Gross per 24 hour   Intake 620 ml   Output 895 ml   Net -275 ml       Laboratory          Recent Labs     12/03/20 0030 12/03/20 0345 12/04/20  0330   SODIUM 135 134* 135   POTASSIUM 4.0 4.0 3.9   CHLORIDE 105 101 103   CO2 21 22 23   BUN 12 12 17   CREATININE 0.85 0.81 0.86   CALCIUM 8.4* 9.0 8.8     Recent Labs     12/02/20 2014 12/03/20  0030 12/03/20  0345 12/04/20  0330   ALTSGPT 13 13  --   --    ASTSGOT 14 14  --   --    ALKPHOSPHAT 102* 87  --   --    TBILIRUBIN 0.5 0.5  --   --    GLUCOSE 151* 156* 152* 159*     Recent Labs     12/02/20 2014 12/03/20  0030 12/03/20  0345 12/04/20  0330   WBC 13.0*  --  9.0 8.7   NEUTSPOLYS 77.40*  --   --   --    LYMPHOCYTES 14.50*  --   --   --    MONOCYTES 7.10  --    --   --    EOSINOPHILS 0.20  --   --   --    BASOPHILS 0.40  --   --   --    ASTSGOT 14 14  --   --    ALTSGPT 13 13  --   --    ALKPHOSPHAT 102* 87  --   --    TBILIRUBIN 0.5 0.5  --   --      Recent Labs     12/02/20 2014 12/03/20  0345 12/04/20  0330   RBC 5.48 5.20 4.57*   HEMOGLOBIN 16.6 15.4 13.7*   HEMATOCRIT 49.4 46.3 41.9*   PLATELETCT 158* 150* 138*   PROTHROMBTM 13.5  --   --    APTT 30.9  --   --    INR 1.00  --   --        Imaging  X-Ray:  I have personally reviewed the images and compared with prior images.  CT:    Reviewed  MRI:   Reviewed    Assessment/Plan  * Acute ischemic left MCA stroke (HCC)- (present on admission)  Assessment & Plan  left ICA stenosis/thrombus with left basilar artery thrombus  IR suite for left ICA thrombus with right M1 thrombus-->s/p thrombectomy with stent and MCA thrombectomy:  TICI 3  Hemorrhagic transformation   Strict blood pressure control with goals < 140  Nicardipine drip for SBP goals as well as labetalol as needed  High dose statin therapy  Maintain euglycemia with BG goals 120-180s  Neuro checks every 1 hours  Neurology following  ECHO  PT/OT/SLP  Aspiration precautions    Continue neurochecks with hemorrhagic transformation will need to continue aspirin and plavix with fresh stent  Monitor need for intubation or hypertonic saline  Palliative care consultation   Stat CT if any changes  Aspirin to 81      COPD (chronic obstructive pulmonary disease) ([facility])  Assessment & Plan  Hx of monitor need for steroids         VTE:  Contraindicated  Ulcer: Not Indicated  Lines: None    I have performed a physical exam and reviewed and updated ROS and Plan today (12/4/2020). In review of yesterday's note (12/3/2020), there are no changes except as documented above.     Discussed patient condition and risk of morbidity and/or mortality with RN, RT, Pharmacy, Charge nurse / hot rounds, Patient and neurology     The patient remains critically ill with acute hemorrhagic  transformation and close monitoring and discussion with neurology.  Critical care time = 35 minutes in directly providing and coordinating critical care and extensive data review.  No time overlap and excludes procedures.

## 2020-12-04 NOTE — PROGRESS NOTES
Notified Dr. Hager of CT results. Neuro ([facility]) notified. New orders received.

## 2020-12-04 NOTE — CARE PLAN
Problem: Safety  Goal: Will remain free from injury  Outcome: PROGRESSING AS EXPECTED     Problem: Knowledge Deficit  Goal: Knowledge of disease process/condition, treatment plan, diagnostic tests, and medications will improve  Outcome: PROGRESSING AS EXPECTED

## 2020-12-04 NOTE — PROGRESS NOTES
Repeat CT head without contrast demonstrated a hemorrhage versus contrast extravasation in the left basal ganglia.  Patient's aspirin dose was reduced to 81 mg daily and continued on Plavix 75 mg daily.  An MRI brain without contrast was obtained in order to determine if this was contrast versus hemorrhage.  MRI revealed that the finding was indeed a hemorrhage.  While there is some abutting of the ventricle the patient has a fresh stent which was placed in order for a thrombectomy to be performed.  In this regard, given that the patient has not worsened clinically from this event and that there is a high risk of reocclusion of the stent if dual antiplatelet therapy is not maintained would favor to continue dual antiplatelet therapy for now.  If there is any clinical worsening then perhaps reversal or decrease to single antiplatelet therapy would be considered.  For now, would maintain systolic blood pressure less than 140    CT Head W/O CST in the am to monitor for stability.

## 2020-12-04 NOTE — PROGRESS NOTES
Pt's brother in to visit.  Took home pt's belongings (clothing, 's license, insurance cards).  Pt's cell phone and notepad left with pt.  Requested brother to bring in pt's dentures when he comes back to visit.

## 2020-12-05 ENCOUNTER — APPOINTMENT (OUTPATIENT)
Dept: RADIOLOGY | Facility: MEDICAL CENTER | Age: 65
DRG: 023 | End: 2020-12-05
Attending: PSYCHIATRY & NEUROLOGY
Payer: MEDICARE

## 2020-12-05 ENCOUNTER — APPOINTMENT (OUTPATIENT)
Dept: CARDIOLOGY | Facility: MEDICAL CENTER | Age: 65
DRG: 023 | End: 2020-12-05
Attending: INTERNAL MEDICINE
Payer: MEDICARE

## 2020-12-05 LAB
ANION GAP SERPL CALC-SCNC: 10 MMOL/L (ref 7–16)
BUN SERPL-MCNC: 17 MG/DL (ref 8–22)
CALCIUM SERPL-MCNC: 9 MG/DL (ref 8.5–10.5)
CHLORIDE SERPL-SCNC: 103 MMOL/L (ref 96–112)
CO2 SERPL-SCNC: 22 MMOL/L (ref 20–33)
CREAT SERPL-MCNC: 0.82 MG/DL (ref 0.5–1.4)
ERYTHROCYTE [DISTWIDTH] IN BLOOD BY AUTOMATED COUNT: 47.1 FL (ref 35.9–50)
GLUCOSE SERPL-MCNC: 162 MG/DL (ref 65–99)
HCT VFR BLD AUTO: 43.4 % (ref 42–52)
HGB BLD-MCNC: 14.3 G/DL (ref 14–18)
MCH RBC QN AUTO: 30.1 PG (ref 27–33)
MCHC RBC AUTO-ENTMCNC: 32.9 G/DL (ref 33.7–35.3)
MCV RBC AUTO: 91.4 FL (ref 81.4–97.8)
PLATELET # BLD AUTO: 133 K/UL (ref 164–446)
PMV BLD AUTO: 10.2 FL (ref 9–12.9)
POTASSIUM SERPL-SCNC: 3.7 MMOL/L (ref 3.6–5.5)
RBC # BLD AUTO: 4.75 M/UL (ref 4.7–6.1)
SODIUM SERPL-SCNC: 135 MMOL/L (ref 135–145)
WBC # BLD AUTO: 8.8 K/UL (ref 4.8–10.8)

## 2020-12-05 PROCEDURE — 700102 HCHG RX REV CODE 250 W/ 637 OVERRIDE(OP): Performed by: NURSE PRACTITIONER

## 2020-12-05 PROCEDURE — 97162 PT EVAL MOD COMPLEX 30 MIN: CPT

## 2020-12-05 PROCEDURE — 97165 OT EVAL LOW COMPLEX 30 MIN: CPT

## 2020-12-05 PROCEDURE — 70450 CT HEAD/BRAIN W/O DYE: CPT

## 2020-12-05 PROCEDURE — 700102 HCHG RX REV CODE 250 W/ 637 OVERRIDE(OP): Performed by: INTERNAL MEDICINE

## 2020-12-05 PROCEDURE — 80048 BASIC METABOLIC PNL TOTAL CA: CPT

## 2020-12-05 PROCEDURE — 99291 CRITICAL CARE FIRST HOUR: CPT | Performed by: INTERNAL MEDICINE

## 2020-12-05 PROCEDURE — 99232 SBSQ HOSP IP/OBS MODERATE 35: CPT | Performed by: NURSE PRACTITIONER

## 2020-12-05 PROCEDURE — 700101 HCHG RX REV CODE 250: Performed by: INTERNAL MEDICINE

## 2020-12-05 PROCEDURE — 93306 TTE W/DOPPLER COMPLETE: CPT

## 2020-12-05 PROCEDURE — 770022 HCHG ROOM/CARE - ICU (200)

## 2020-12-05 PROCEDURE — 93306 TTE W/DOPPLER COMPLETE: CPT | Mod: 26 | Performed by: STUDENT IN AN ORGANIZED HEALTH CARE EDUCATION/TRAINING PROGRAM

## 2020-12-05 PROCEDURE — 700102 HCHG RX REV CODE 250 W/ 637 OVERRIDE(OP): Performed by: PSYCHIATRY & NEUROLOGY

## 2020-12-05 PROCEDURE — A9270 NON-COVERED ITEM OR SERVICE: HCPCS | Performed by: NURSE PRACTITIONER

## 2020-12-05 PROCEDURE — 700117 HCHG RX CONTRAST REV CODE 255: Performed by: INTERNAL MEDICINE

## 2020-12-05 PROCEDURE — A9270 NON-COVERED ITEM OR SERVICE: HCPCS | Performed by: PSYCHIATRY & NEUROLOGY

## 2020-12-05 PROCEDURE — 85027 COMPLETE CBC AUTOMATED: CPT

## 2020-12-05 PROCEDURE — A9270 NON-COVERED ITEM OR SERVICE: HCPCS | Performed by: INTERNAL MEDICINE

## 2020-12-05 RX ORDER — LISINOPRIL 10 MG/1
10 TABLET ORAL
Status: DISCONTINUED | OUTPATIENT
Start: 2020-12-05 | End: 2020-12-08 | Stop reason: HOSPADM

## 2020-12-05 RX ORDER — POTASSIUM CHLORIDE 20 MEQ/1
40 TABLET, EXTENDED RELEASE ORAL ONCE
Status: COMPLETED | OUTPATIENT
Start: 2020-12-05 | End: 2020-12-05

## 2020-12-05 RX ADMIN — ATORVASTATIN CALCIUM 40 MG: 40 TABLET, FILM COATED ORAL at 17:11

## 2020-12-05 RX ADMIN — HUMAN ALBUMIN MICROSPHERES AND PERFLUTREN 3 ML: 10; .22 INJECTION, SOLUTION INTRAVENOUS at 15:45

## 2020-12-05 RX ADMIN — LABETALOL HYDROCHLORIDE 15 MG: 5 INJECTION, SOLUTION INTRAVENOUS at 09:08

## 2020-12-05 RX ADMIN — LABETALOL HYDROCHLORIDE 10 MG: 5 INJECTION, SOLUTION INTRAVENOUS at 01:06

## 2020-12-05 RX ADMIN — AMLODIPINE BESYLATE 10 MG: 10 TABLET ORAL at 17:11

## 2020-12-05 RX ADMIN — HYDRALAZINE HYDROCHLORIDE 25 MG: 50 TABLET, FILM COATED ORAL at 01:56

## 2020-12-05 RX ADMIN — POTASSIUM CHLORIDE 40 MEQ: 1500 TABLET, EXTENDED RELEASE ORAL at 10:20

## 2020-12-05 RX ADMIN — CLOPIDOGREL BISULFATE 75 MG: 75 TABLET ORAL at 05:31

## 2020-12-05 RX ADMIN — LISINOPRIL 10 MG: 10 TABLET ORAL at 10:20

## 2020-12-05 RX ADMIN — ASPIRIN 81 MG: 81 TABLET, COATED ORAL at 05:31

## 2020-12-05 ASSESSMENT — ENCOUNTER SYMPTOMS
MYALGIAS: 0
DEPRESSION: 0
SORE THROAT: 0
HALLUCINATIONS: 0
LOSS OF CONSCIOUSNESS: 0
CLAUDICATION: 0
FEVER: 0
SEIZURES: 0
DOUBLE VISION: 0
TREMORS: 0
WEAKNESS: 0
HEADACHES: 0
COUGH: 0
SPEECH CHANGE: 0
NAUSEA: 0
SHORTNESS OF BREATH: 0
VOMITING: 0
FOCAL WEAKNESS: 0
TINGLING: 0
NERVOUS/ANXIOUS: 0

## 2020-12-05 ASSESSMENT — COGNITIVE AND FUNCTIONAL STATUS - GENERAL
SUGGESTED CMS G CODE MODIFIER DAILY ACTIVITY: CL
CLIMB 3 TO 5 STEPS WITH RAILING: A LOT
EATING MEALS: A LITTLE
MOVING FROM LYING ON BACK TO SITTING ON SIDE OF FLAT BED: A LITTLE
MOBILITY SCORE: 16
DRESSING REGULAR LOWER BODY CLOTHING: A LOT
MOVING TO AND FROM BED TO CHAIR: UNABLE
WALKING IN HOSPITAL ROOM: A LITTLE
SUGGESTED CMS G CODE MODIFIER MOBILITY: CK
DAILY ACTIVITIY SCORE: 13
PERSONAL GROOMING: A LOT
STANDING UP FROM CHAIR USING ARMS: A LITTLE
DRESSING REGULAR UPPER BODY CLOTHING: A LOT
HELP NEEDED FOR BATHING: A LOT
TOILETING: A LOT

## 2020-12-05 ASSESSMENT — GAIT ASSESSMENTS
ASSISTIVE DEVICE: HAND HELD ASSIST
GAIT LEVEL OF ASSIST: MINIMAL ASSIST
DISTANCE (FEET): 100
DEVIATION: DECREASED BASE OF SUPPORT;BRADYKINETIC

## 2020-12-05 ASSESSMENT — ACTIVITIES OF DAILY LIVING (ADL): TOILETING: INDEPENDENT

## 2020-12-05 NOTE — THERAPY
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 Pt very motivated, speaking in 1-2 word sentences and endorses word finding difficulties; however, followed all 2 step commands, intact coordination; some proximal hip weakness on right side but improves with ambulation; recommend PMR consult as from a PT perspective pt is an excellent rehab candidate and requires multiple disciplines; has brother's assist for home with limited environmental barriers and was independent working as a lio. Will follow to progress, anticipate quick progress.     Plan    Recommend Physical Therapy 4 times per week until therapy goals are met for the following treatments:  Bed Mobility, Equipment, Gait Training, Manual Therapy, Neuro Re-Education / Balance, Self Care/Home Evaluation, Sensory Integration Techniques, Stair Training, Therapeutic Activities and Therapeutic Exercises    DC Equipment Recommendations:  Unable to determine at this time  Discharge Recommendations: Recommend post-acute placement for additional physical therapy services prior to discharge home       Abridged Subjective/Objective       12/05/20 1040   Prior Living Situation   Prior Services None   Housing / Facility 1 Story House   Steps Into Home 3   Equipment Owned None   Lives with - Patient's Self Care Capacity Other (Comments)   Comments brother owns home; denies falls was working as a lio, some word finding difficulty    Prior Level of Functional Mobility   Bed Mobility Independent   Transfer Status Independent   Ambulation Independent   Distance Ambulation (Feet)   (to tolerance)   Assistive Devices Used None   Stairs Independent   History of Falls    History of Falls No   Cognition    Cognition / Consciousness X   Level of Consciousness Alert   Comments subjective word finding impairment; only speaking in one to two word sentences but on topic for conversation; otherwise, pleasant and motivated ;    Passive ROM Lower Body   Passive ROM Lower Body WDL   Strength Lower Body   Lower Body Strength  X   Comments did show some initial slowed initiation of right LE movemnent, gorossly 4/5 at ankle/knee; hip 3+/5;   Sensation Lower Body   Lower Extremity Sensation   WDL   Comments intact to light touch/deep pressure    Lower Body Muscle Tone   Lower Body Muscle Tone  WDL   Vision   Vision Comments denies issues, tracking throughout fields    Balance Assessment   Sitting Balance (Static) Fair +   Sitting Balance (Dynamic) Fair   Standing Balance (Static) Poor +   Standing Balance (Dynamic) Poor +   Weight Shift Sitting Good   Weight Shift Standing Fair   Comments B Ue support in sitting/standing; needs cues for right hand involvement, HHA of 2 rather than use of walker;    Gait Analysis   Gait Level Of Assist Minimal Assist   Assistive Device Hand Held Assist   Distance (Feet) 100   # of Times Distance was Traveled 3   Deviation Decreased Base Of Support;Bradykinetic   Weight Bearing Status full   Vision Deficits Impacting Mobility denies   Comments distance limited by therapist, pt with increased sway/loss of balance with conversation and head turns    Bed Mobility    Supine to Sit Minimal Assist  (increased time; to right of bed )   Sit to Supine   (Nt, sitting in chair post)   Functional Mobility   Sit to Stand Minimal Assist  (with HHA of 2 )   Bed, Chair, Wheelchair Transfer Minimal Assist  (with HHA of 2 )   Patient / Family Goals    Patient / Family Goal #1 to improve and return home;    Short Term Goals    Short Term Goal # 1 Pt will perform supine<>sit from flat HOB/no railing with supervision within 6 visits to ensure independent mobility at home.    Short  Term Goal # 2 Pt will perform sit<>stand with no device and supervision within 6 visits to ensure independent mobility at home.    Short Term Goal # 3 Pt will ambulate x 150ft without device and supervision within 6 visits to ensure independent mobility at home.    Short Term Goal # 4 Pt will ascend/descend 3 stairs with unilateral UE support and supervision within 6 visits to ensure independent mobiltiy at home.    Education Group   Role of Physical Therapist Patient Response Patient;Acceptance;Explanation;Demonstration;Verbal Demonstration;Action Demonstration   Exercises - Seated Patient Response Patient;Acceptance;Demonstration;Explanation;Action Demonstration;Verbal Demonstration  (ankle pumps, long arc quads )

## 2020-12-05 NOTE — THERAPY
Occupational Therapy   Initial Evaluation     Patient Name: Tejinder Ruiz  Age:  65 y.o., Sex:  male  Medical Record #: [id number]  Today's Date: 12/5/2020     Precautions  Precautions: Fall Risk, Swallow Precautions ( See Comments)  Comments: L ICA occlusion; L BG hemmorrhage; L MCA CVA     Assessment  Patient is 65 y.o. male presents to skilled OT services following L MCA and basal ganglia hemorrhage s/p thrombectomy. Pt currently demonstrates decreased R wrist and hand ROM, FM and GM motor control, strength and coordination deficits, shoulder and elbow 4+/5,  LUE functional, intermittent word finding difficulties noted, R sided mild inattention noted with bed mobility and in dynamic environment when cued does attend to R side and is localizing to sound without cues. Pt currently requires mod a for ADLs and min a for functional mobility. Pt demonstrates decreased RLE gross motor control, balance and generalized endurance deficits. Pt is motivated to participate in therapy and regain his independence. Consider PMR consult and will follow while in house.     Plan    Recommend Occupational Therapy 4 times per week until therapy goals are met for the following treatments:  Adaptive Equipment, Cognitive Skill Development, Manual Therapy Techniques, Neuro Re-Education / Balance, Self Care/Activities of Daily Living, Therapeutic Activities and Therapeutic Exercises.    DC Equipment Recommendations: Unable to determine at this time  Discharge Recommendations: Recommend post-acute placement for additional occupational therapy services prior to discharge home(consider PMR consult)     Objective       12/05/20 1101   Prior Living Situation   Prior Services None   Housing / Facility 1 Story House   Steps Into Home 3   Bathroom Set up Walk In Shower   Equipment Owned None   Lives with - Patient's Self Care Capacity Other (Comments)  (Brother)   Comments I with ADLs/IADls baseline, brother works during daytime but able to assist w/  IADLs   Prior Level of ADL Function   Self Feeding Independent   Grooming / Hygiene Independent   Bathing Independent   Dressing Independent   Toileting Independent   Prior Level of IADL Function   Medication Management Independent   Laundry Independent   Kitchen Mobility Independent   Finances Independent   Home Management Independent   Shopping Independent   Prior Level Of Mobility Independent Without Device in Community   Driving / Transportation Driving Independent   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 L hand functional   Strength Upper Body   Upper Body Strength  X   Comments R hand wrist and digit extension trace+, L hand functiona   Sensation Upper Body   Upper Extremity Sensation  X   Comments decreased light touch of R hand, denies N/T of UE's   Coordination Upper Body   Coordination X   Comments R hand impaired, LUE functional   Balance Assessment   Sitting Balance (Static) Fair +   Sitting Balance (Dynamic) Fair   Standing Balance (Static) Poor +   Standing Balance (Dynamic) Poor +   Weight Shift Sitting Good   Weight Shift Standing Fair   Comments w/ HHA of 2 people, as pt has trace +  strength and hand control to maintain  on FWW   Bed Mobility    Supine to Sit Minimal Assist  (w/ increased time and cues for use of R hand )   Sit to Supine   (UIC post session)   Scooting Supervised  (seated)   Rolling   (sit pivot)   Comments raised hob    ADL Assessment   Eating Minimal Assist   Grooming Seated;Moderate Assist   Bathing   (NT)   Upper Body Dressing Moderate Assist   Lower Body Dressing Maximal Assist  (for donning socks, poor R  hand coordination)   Toileting   (declined need to use BR)   Functional Mobility   Sit to Stand Minimal Assist   Bed, Chair, Wheelchair Transfer Minimal Assist   Toilet Transfers Refused   Comments w/HHA of 2 people, cues required to attend to R side intermittently   Visual Perception   Visual Perception  X   Comments R side inattention noted, but with verbal cues localizing and attending to Rt   Short Term Goals   Short Term Goal # 1 Pt will perform LB dressing with min a   Short Term Goal # 2 Pt will demonstrate 3/5 R hand  strength   Short Term Goal # 3 Pt will demonstrate full flex/ext of R hand digits   Short Term Goal # 4 Pt will perform functional t/f's w/ supervision   Anticipated Discharge Equipment and Recommendations   DC Equipment Recommendations Unable to determine at this time

## 2020-12-05 NOTE — PROGRESS NOTES
Neurology Progress Note  Neurohospitalist Service, [facility]    Referring Physician: Isaac Hager M.D.    Chief Complaint   Patient presents with   • Possible Stroke     Patient a transfer from [facility] with a L ICA occlusion; pt to go to IR       HPI: Refer to initial documented Neurology H&P, as detailed in the patient's chart.    Interval History 12/5/2020: No acute events overnight. Interval CT head wo contrast this AM stable. Patient is currently sitting up in bed, awake, alert, fully oriented, and following commands. Persistent mild dysarthria, expressive aphasia, and slight right hemiparesis. Otherwise denies headache, vision changes, numbness, tingling, or confusion.     Past Medical History:   No past medical history on file.     FHx:  No family history on file.     SHx:  Social History     Socioeconomic History   • Marital status: Single     Spouse name: Not on file   • Number of children: Not on file   • Years of education: Not on file   • Highest education level: Not on file   Occupational History   • Not on file   Social Needs   • Financial resource strain: Not on file   • Food insecurity     Worry: Not on file     Inability: Not on file   • Transportation needs     Medical: Not on file     Non-medical: Not on file   Tobacco Use   • Smoking status: Not on file   Substance and Sexual Activity   • Alcohol use: Not on file   • Drug use: Not on file   • Sexual activity: Not on file   Lifestyle   • Physical activity     Days per week: Not on file     Minutes per session: Not on file   • Stress: Not on file   Relationships   • Social connections     Talks on phone: Not on file     Gets together: Not on file     Attends Che[employer] service: Not on file     Active member of club or organization: Not on file     Attends meetings of clubs or organizations: Not on file     Relationship status: Not on file   • Intimate partner violence     Fear of current or ex partner: Not on file      Emotionally abused: Not on file     Physically abused: Not on file     Forced sexual activity: Not on file   Other Topics Concern   • Not on file   Social History Narrative   • Not on file        Medications:    Current Facility-Administered Medications:   •  aspirin EC (ECOTRIN) tablet 81 mg, 81 mg, Oral, DAILY, ARLENE Núñez, 81 mg at 12/05/20 0531  •  amLODIPine (NORVASC) tablet 10 mg, 10 mg, Oral, Q DAY, Isaac Hager M.D., 10 mg at 12/04/20 1616  •  hydrALAZINE (APRESOLINE) tablet 25 mg, 25 mg, Oral, Q6HRS PRN, Isaac Hager M.D., 25 mg at 12/05/20 0156  •  clopidogrel (PLAVIX) tablet 75 mg, 75 mg, Oral, DAILY, Isaac Hager M.D., 75 mg at 12/05/20 0531  •  atorvastatin (LIPITOR) tablet 40 mg, 40 mg, Oral, Q EVENING, Geovanny Castro M.D., 40 mg at 12/04/20 1744  •  senna-docusate (PERICOLACE or SENOKOT S) 8.6-50 MG per tablet 2 Tab, 2 Tab, Oral, BID, Stopped at 12/04/20 1800 **AND** polyethylene glycol/lytes (MIRALAX) PACKET 1 Packet, 1 Packet, Oral, QDAY PRN **AND** magnesium hydroxide (MILK OF MAGNESIA) suspension 30 mL, 30 mL, Oral, QDAY PRN **AND** bisacodyl (DULCOLAX) suppository 10 mg, 10 mg, Rectal, QDAY PRN, Bigg Morfin M.D.  •  acetaminophen (Tylenol) tablet 650 mg, 650 mg, Oral, Q6HRS PRN, Bigg Morfin M.D.  •  labetalol (NORMODYNE/TRANDATE) injection 10-20 mg, 10-20 mg, Intravenous, Q4HRS PRN, Ysabel Elizondo M.D., 15 mg at 12/05/20 0908    Allergies:  Not on File     Review of systems: In addition to what is detailed in the HPI and interval history above, all other systems reviewed and are negative.      Physical Examination:  Vitals:    12/05/20 0600 12/05/20 0700 12/05/20 0800 12/05/20 0900   BP: 138/71 138/71 142/72 159/71   Pulse: 61 (!) 56 (!) 55 (!) 52   Resp: 19 17 (!) 37 19   Temp: 36.7 °C (98.1 °F)  36.8 °C (98.2 °F)    TempSrc: Temporal  Temporal    SpO2: 97% 95% 92% 98%   Weight:       Height:         General: Patient in no acute distress, pleasant  and cooperative.  HEENT: Normocephalic, no signs of acute trauma.   Neck: Supple, no meningeal signs or carotid bruits. There is normal range of motion. No tenderness on exam.   Chest: Clear to auscultation. No cough.   CV: RRR, no murmurs.   Skin: No signs of acute rashes or trauma.   Musculoskeletal: Joints exhibit full range of motion, without any pain to palpation. There are no signs of joint or muscle swelling. There is no tenderness to deep palpation of muscles.   Psychiatric: No hallucinatory behavior. Denies symptoms of depression or suicidal ideation. Mood and affect appear withdrawn on exam.     NEUROLOGICAL EXAM:   Mental status, orientation: Awake, alert, following commands, and fully oriented.   Speech and language: Speech is mildly dysarthric and fluent with subtle expressive aphasia. The patient is able to name, repeat with 1 paraphasic error, and comprehend.   Memory: There is intact recollection of recent and remote events.   Cranial nerve exam: Pupils are 3-4 mm bilaterally and equally reactive to light and accommodation. Visual fields are full to field test. There is no nystagmus on primary or secondary gaze. Intact full EOM in all directions of gaze. Face appears asymmetric with right nasolabial fold flattening. Sensation in the face is intact to light touch. Uvula is midline. Palate elevates symmetrically. Tongue is midline and without any signs of tongue biting or fasciculations. Sternocleidomastoid muscles exhibit is normal strength bilaterally. Shoulder shrug is intact bilaterally.   Motor exam: Strength is 5/5 in LUE and LLE. RUE 4/5 with right pronator drift and proximal greater than distal strength, RLE 4+/5 with slight drift. Tone is normal. No abnormal movements were seen on exam.  Sensory exam Reveals normal sense of light touch in all extremities.   Deep tendon reflexes:  2+ throughout. Plantar responses are down-going to left, up-going to right. There is no clonus.   Coordination:  Shows a normal finger-nose-finger to LUE and normal heel-down-shin to LUE. No ataxia, but difficulty to RUE and RLE secondary to hemiplegia. Normal rapidly alternating movements.   Gait: Deferred per patient preference. RN to assist patient to ambulate later in day.       NIH Stroke Scale  12/5/20 09:45    1a. Level of Consciousness (Alert, drowsy, etc): 0= Alert    1b. LOC Questions (Month, age): 0= Answers both correctly    1c. LOC Commands (Open/close eyes make fist/let go): 0= Obeys both correctly    2.   Best Gaze (Eyes open - patient follows examiner's finger on face): 0= Normal    3.   Visual Fields (introduce visual stimulus/threat to patient's field quadrants): 0= No visual loss    4.   Facial Paresis (Show teeth, raise eyebrows and squeeze eyes shut): 1= Minor     5a. Motor Arm - Left (Elevate arm to 90 degrees if patient is sitting, 45 degrees if  supine): 0= No drift    5b. Motor Arm - Right (Elevate arm to 90 degrees if patient is sitting, 45 degrees if supine): 1= Drift    6a. Motor Leg - Left (Elevate leg 30 degrees with patient supine): 0= No drift    6b. Motor Leg - Right  (Elevate leg 30 degrees with patient supine): 1= Drift    7.   Limb Ataxia (Finger-nose, heel down shin): 0= No ataxia    8.   Sensory (Pin prick to face, arm, trunk and leg - compare side to side): 0= Normal    9.  Best Language (Name item, describe a picture and read sentences): 1= Mild to moderate aphasia    10. Dysarthria (Evaluate speech clarity by patient repeating listed words): 1= Mild to moderate slurring    11. Extinction and Inattention (Use information from prior testing to identify neglect or  double simultaneous stimuli testing): 0= No neglect    Total NIH Score: 5        Ancillary Data Reviewed:    Labs:  Lab Results   Component Value Date/Time    PROTHROMBTM 13.5 12/02/2020 08:14 PM    INR 1.00 12/02/2020 08:14 PM      Lab Results   Component Value Date/Time    WBC 8.8 12/05/2020 04:00 AM    RBC 4.75 12/05/2020  04:00 AM    HEMOGLOBIN 14.3 12/05/2020 04:00 AM    HEMATOCRIT 43.4 12/05/2020 04:00 AM    MCV 91.4 12/05/2020 04:00 AM    MCH 30.1 12/05/2020 04:00 AM    MCHC 32.9 (L) 12/05/2020 04:00 AM    MPV 10.2 12/05/2020 04:00 AM    NEUTSPOLYS 77.40 (H) 12/02/2020 08:14 PM    LYMPHOCYTES 14.50 (L) 12/02/2020 08:14 PM    MONOCYTES 7.10 12/02/2020 08:14 PM    EOSINOPHILS 0.20 12/02/2020 08:14 PM    BASOPHILS 0.40 12/02/2020 08:14 PM      Lab Results   Component Value Date/Time    SODIUM 135 12/05/2020 04:00 AM    POTASSIUM 3.7 12/05/2020 04:00 AM    CHLORIDE 103 12/05/2020 04:00 AM    CO2 22 12/05/2020 04:00 AM    GLUCOSE 162 (H) 12/05/2020 04:00 AM    BUN 17 12/05/2020 04:00 AM    CREATININE 0.82 12/05/2020 04:00 AM      Lab Results   Component Value Date/Time    CHOLSTRLTOT 151 12/03/2020 12:30 AM    LDL 81 12/03/2020 12:30 AM    HDL 52 12/03/2020 12:30 AM    TRIGLYCERIDE 92 12/03/2020 12:30 AM       Lab Results   Component Value Date/Time    ALKPHOSPHAT 87 12/03/2020 12:30 AM    ASTSGOT 14 12/03/2020 12:30 AM    ALTSGPT 13 12/03/2020 12:30 AM    TBILIRUBIN 0.5 12/03/2020 12:30 AM        Imaging/Testing:    I interpreted and/or reviewed the patient's neuroimaging    CT-HEAD W/O   Final Result         1.  Hyperdensity in the left basal ganglia with associated mass effect of the left ventricle, stable since prior study, corresponding with hemorrhagic conversion of infarct as documented on MRI performed yesterday.   2.  Changes of atrophy with nonspecific white matter changes, most commonly associated with small vessel ischemia   3.  Atherosclerosis      MR-BRAIN-W/O   Final Result         1. Age-related cerebral atrophy.      2. Mild periventricular white matter changes consistent with chronic microvascular ischemic gliosis.      3. Moderate sized wedge-shaped area of acute hemorrhagic infarction involving the left basal ganglia and extending into the left frontal periventricular white matter. Further there is moderate  effacement of the left frontal horn and body left lateral    ventricle with minimal left to right midline shift of the ventricular system.      4. Additional gyriform and punctate areas of acute infarction are noted in the left frontal parasylvian region.      CT-HEAD W/O   Final Result         1.  Hyperdensity in the left basal ganglia with associated mass effect of the left ventricle, appearance concerning for basal ganglia hemorrhage with surrounding vasogenic edema. Could represent contrast staining from thrombectomy with associated    vasogenic edema however given the degree of mass effect appearance is concerning for hemorrhage.      These findings were discussed with the patient's clinician, Bigg Morfin, on 12/4/2020 4:41 AM.      [id number] VIEW   Final Result      No radiopaque foreign body or implant is identified to preclude MRI evaluation.      IR-THROMBO MECHANICAL ARTERY,INIT   Final Result      Preprocedural findings:   *  Severe atherosclerotic stenosis at the origin of the left internal carotid artery with thrombus.   *  Left M1 thrombus      Intervention:   *  Left carotid angioplasty and stent placement with embolic protection device.   *  Left cervical carotid aspiration thrombectomy.   *  Left M1 segment aspiration thrombectomy.   Post intervention findings:   *  Patent left internal carotid and middle cerebral branches-TICI 3      DX-CHEST-PORTABLE (1 VIEW)   Final Result         1.  No acute cardiopulmonary disease.   2.  Atherosclerosis      CT-CTA HEAD WITH & W/O-POST PROCESS   Final Result         1.  Partial filling defect of the left M1 segment, compatible with partial thrombus. See dedicated CT of the neck for evaluation of the left internal carotid artery      CT-CTA NECK WITH & W/O-POST PROCESSING   Final Result         1.  High-grade stenosis of the proximal left internal carotid artery with filling defect seen extending within the left internal carotid artery, appearance favors  thrombus with free-floating segments.   2.  Atherosclerosis of the proximal right internal carotid artery results in approximately 50% stenosis      These findings were discussed with the patient's clinician, Dr. Hyde, on 12/2/2020 9:00 PM.      CT-CEREBRAL PERFUSION ANALYSIS   Final Result         1.  Cerebral blood flow less than 30% likely representing completed infarct = 0 mL.      2.  T Max more than 6 seconds likely representing combination of completed infarct and ischemia = 98 mL.      3.  Mismatched volume likely representing ischemic brain/penumbra = 98 mL      4.  Please note that the cerebral perfusion was performed on the limited brain tissue around the basal ganglia region. Infarct/ischemia outside the CT perfusion sections can be missed in this study.      OUTSIDE IMAGES-DX CHEST   Final Result      OUTSIDE IMAGES-CT HEAD   Final Result      OUTSIDE IMAGES-CT HEAD   Final Result      EC-ECHOCARDIOGRAM PEDIATRIC COMPLETE W/ CONT    (Results Pending)       Assessment:    Tejinder Ruiz is a 65 y.o. male with relevant history of vascular risk factors who presented as transfer from [facility] after being found unresponsive with right-sided weakness on 12/2/20 whom neurology was consulted to address acute ischemic stroke secondary to left ICA occlusion seen on CTA imaging at outside hospital. Patient was transferred to [facility] for higher level of care and emergent IR left MCA and ICA thrombectomy and stent placement to left ICA on 12/2/20. Initial NIHSS was 16 now improved to 4 yesterday and stable at 5 today significant for expressive aphasia, mild dysarthria, persistent right nasolabial fold flattening, and right hemiparesis. Interval CT head wo contrast on 12/3/20 23:26 revealed hypodensity of the left basal ganglia with associated mass effect concerning for hemorrhage with surrounding vasogenic edema versus contrast staining from thrombectomy. MRI brain wo contrast was completed 12/4/20  revealed moderate acute left MCA territory infarct with moderate sized acute hemorrhagic transformation of the left basal ganglia extending into the left frontal periventricular white matter with left lateral ventricular effacement and minimal left to right midline shift.  Given neurological exam improved and is now stable and had newly placed stent to allow for thrombectomy to be performed, dual antiplatelet therapy is continued to prevent due to a high risk of reocclusion. Etiology is thromboembolic given known left ICA and left MCA occlusion status post thrombectomy and stent placement.    Plan:       -q2h and PRN neuro assessment. Monitor for s/s of cerebral edema.   -VS per nursing/unit protocol.   -BP goal < 140/90 following thrombectomy and stent placement with TICI 3 reperfusion in effort to avoid reperfusion injury. Antihypertensives per primary team.   -Telemetry; currently SR. Screen for Afib/arrhythmia.   -Obtain TTE with bubble study.   -Avoid anticoagulation  -Continue DAPT with ASA 81 mg PO daily and Plavix 75mg PO daily for 90 days s/p stent  -Continue Atorvastatin 40 mg PO q HS. Note lipid panel LDL 81 (goal<70) and HDL 52 (within goal).   -Recommend aggressive BG management per primary team. Avoid IVF with Dextrose. BG goal 140-180. Note hemoglobin A1c 7.4 (indicative of DM type II, goal<7).   -PT/OT/SLP eval and treat. See notes for recommendations, respectively.   -Counseled patient at length regarding life style and risk factor modification for secondary stroke prevention.   -Follow up outpatient [facility]. Referral placed.  -Follow up outpatient at IR clinic.   -All other medical management per ICU/primary team.   -DVT PPX: SCDs.     The evaluation of the patient, and recommended management, was discussed with Dr. Loni Hdez, Dr. Hager, and bedside RN. I have performed a physical exam and reviewed and updated ROS and Plan today (12/5/2020). In review of yesterday's note  (12/4/2020), there are no changes except as documented above.    SHUN Núñez.   Nurse Practitioner, Neurohospitalist  [facility]  t) [phone] (f) [phone]

## 2020-12-05 NOTE — PROGRESS NOTES
Critical Care Progress Note    Date of admission  12/2/2020    Chief Complaint  Mr. Ruiz is a 65 year old male with an unknown past medical history who was transferred from [facility] for stroke symptoms.  The patient was last seen normal around 8-9 pm.  The patient was found unresponsive by his roommate this evening at 6pm.  When EMS arrived, the patient had a GCS of 3 but was alert and aphasic with right sided deficits.  The patient's NIH score was 27 at the outlying facility.  He was not an alteplase candidate.  His CT-CTA head revealed thrombus to the left ICA and left basilar artery with acute left MCA stroke symptoms.  He was transferred to [facility] and seen by neurology as well as IR.  He was sent to IR for left ICA stenting and then integrilin infusion. Taken Dr Elizondo.     Hospital Course  No notes on file    Interval Problem Update  Reviewed last 24 hour events:  Ct planned   Neuro: q2 check ao3-4 forget to event, mild dysartharia mild right arm and leg  HR: 50-70's  SBP: labetalol once oral hydralazine  Tmax: afebrile  GI: regular diet level 4, BM 12/4  UOP: incontinent to urine  Lines: peripheral IVs  Resp: room air   Vte: none  PPI/H2:none  Antibx: none    Aspirin and plavix  Add lisinopril 10mg   k replace  Palliative care patient would like to be DNR and DNI  Pulse form filled out    Review of Systems  Review of Systems   Constitutional: Negative for fever and malaise/fatigue.   HENT: Negative for sore throat.    Eyes: Negative for double vision.   Respiratory: Negative for cough and shortness of breath.    Cardiovascular: Negative for chest pain, claudication and leg swelling.   Gastrointestinal: Negative for nausea and vomiting.   Genitourinary: Negative for dysuria and hematuria.   Musculoskeletal: Negative for joint pain and myalgias.   Neurological: Negative for tingling, tremors, speech change, focal weakness, seizures, loss of consciousness, weakness and headaches.    Psychiatric/Behavioral: Negative for depression and hallucinations. The patient is not nervous/anxious.         Vital Signs for last 24 hours   Temp:  [36.6 °C (97.9 °F)-37 °C (98.6 °F)] 36.7 °C (98.1 °F)  Pulse:  [53-77] 56  Resp:  [17-24] 17  BP: (124-174)/(57-78) 138/71  SpO2:  [92 %-99 %] 95 %    Hemodynamic parameters for last 24 hours       Respiratory Information for the last 24 hours       Physical Exam   Physical Exam  Vitals signs and nursing note reviewed.   Constitutional:       General: He is not in acute distress.     Appearance: He is not ill-appearing.      Comments: Sitting up in chair no acute distress   HENT:      Head: Normocephalic.      Nose: No congestion.      Mouth/Throat:      Mouth: Mucous membranes are dry.      Pharynx: No oropharyngeal exudate.   Eyes:      Extraocular Movements: Extraocular movements intact.      Pupils: Pupils are equal, round, and reactive to light.   Neck:      Musculoskeletal: No neck rigidity or muscular tenderness.   Cardiovascular:      Rate and Rhythm: Normal rate.      Heart sounds: No murmur.   Pulmonary:      Effort: No respiratory distress.      Breath sounds: No stridor. No wheezing or rhonchi.   Abdominal:      General: There is no distension.      Palpations: There is no mass.      Tenderness: There is no abdominal tenderness. There is no guarding or rebound.      Hernia: No hernia is present.   Musculoskeletal:         General: No swelling or tenderness.   Skin:     Coloration: Skin is not jaundiced or pale.   Neurological:      Mental Status: He is alert.      Comments: Aox4, speech has improved, was up walking with pt today weakness to right hand 5-/5 right leg 5-/5, full strength on left, sensation intact, no facial droop, speech clear.    Psychiatric:         Mood and Affect: Mood normal.         Medications  Current Facility-Administered Medications   Medication Dose Route Frequency Provider Last Rate Last Admin   • aspirin EC (ECOTRIN) tablet 81  mg  81 mg Oral DAILY ALRENE Núñez   81 mg at 12/05/20 0531   • amLODIPine (NORVASC) tablet 10 mg  10 mg Oral Q DAY Isaac Hager M.D.   10 mg at 12/04/20 1616   • hydrALAZINE (APRESOLINE) tablet 25 mg  25 mg Oral Q6HRS PRN Isaac Hager M.D.   25 mg at 12/05/20 0156   • clopidogrel (PLAVIX) tablet 75 mg  75 mg Oral DAILY Isaac Hager M.D.   75 mg at 12/05/20 0531   • atorvastatin (LIPITOR) tablet 40 mg  40 mg Oral Q EVENING Geovanny Castro M.D.   40 mg at 12/04/20 1744   • senna-docusate (PERICOLACE or SENOKOT S) 8.6-50 MG per tablet 2 Tab  2 Tab Oral BID Bigg Morfin M.D.   Stopped at 12/04/20 1800    And   • polyethylene glycol/lytes (MIRALAX) PACKET 1 Packet  1 Packet Oral QDAY PRN Bigg Morfin M.D.        And   • magnesium hydroxide (MILK OF MAGNESIA) suspension 30 mL  30 mL Oral QDAY PRN Bigg Morfin M.D.        And   • bisacodyl (DULCOLAX) suppository 10 mg  10 mg Rectal QDAY PRN Bigg Morfin M.D.       • acetaminophen (Tylenol) tablet 650 mg  650 mg Oral Q6HRS PRN Bigg Morfin M.D.       • labetalol (NORMODYNE/TRANDATE) injection 10-20 mg  10-20 mg Intravenous Q4HRS PRN Ysabel Elizondo M.D.   10 mg at 12/05/20 0106       Fluids    Intake/Output Summary (Last 24 hours) at 12/5/2020 0754  Last data filed at 12/5/2020 0600  Gross per 24 hour   Intake 520 ml   Output 480 ml   Net 40 ml       Laboratory          Recent Labs     12/03/20  0345 12/04/20  0330 12/05/20  0400   SODIUM 134* 135 135   POTASSIUM 4.0 3.9 3.7   CHLORIDE 101 103 103   CO2 22 23 22   BUN 12 17 17   CREATININE 0.81 0.86 0.82   CALCIUM 9.0 8.8 9.0     Recent Labs     12/02/20 2014 12/03/20 0030 12/03/20 0345 12/04/20 0330 12/05/20  0400   ALTSGPT 13 13  --   --   --    ASTSGOT 14 14  --   --   --    ALKPHOSPHAT 102* 87  --   --   --    TBILIRUBIN 0.5 0.5  --   --   --    GLUCOSE 151* 156* 152* 159* 162*     Recent Labs     12/02/20 2014 12/03/20 0030 12/03/20 0345 12/04/20  0330  12/05/20  0400   WBC 13.0*  --  9.0 8.7 8.8   NEUTSPOLYS 77.40*  --   --   --   --    LYMPHOCYTES 14.50*  --   --   --   --    MONOCYTES 7.10  --   --   --   --    EOSINOPHILS 0.20  --   --   --   --    BASOPHILS 0.40  --   --   --   --    ASTSGOT 14 14  --   --   --    ALTSGPT 13 13  --   --   --    ALKPHOSPHAT 102* 87  --   --   --    TBILIRUBIN 0.5 0.5  --   --   --      Recent Labs     12/02/20 2014 12/03/20  0345 12/04/20  0330 12/05/20  0400   RBC 5.48 5.20 4.57* 4.75   HEMOGLOBIN 16.6 15.4 13.7* 14.3   HEMATOCRIT 49.4 46.3 41.9* 43.4   PLATELETCT 158* 150* 138* 133*   PROTHROMBTM 13.5  --   --   --    APTT 30.9  --   --   --    INR 1.00  --   --   --        Imaging  X-Ray:  I have personally reviewed the images and compared with prior images.  CT:    Reviewed  MRI:   Reviewed    Assessment/Plan  * Acute ischemic left MCA stroke (HCC)- (present on admission)  Assessment & Plan  left ICA stenosis/thrombus with left basilar artery thrombus  IR suite for left ICA thrombus with right M1 thrombus-->s/p thrombectomy with stent and MCA thrombectomy:  TICI 3  Hemorrhagic transformation   Strict blood pressure control with goals < 140  Nicardipine drip for SBP goals as well as labetalol as needed  High dose statin therapy  Maintain euglycemia with BG goals 120-180s  Neuro checks every 1 hours  Neurology following  ECHO  PT/OT/SLP  Aspiration precautions    Continue neurochecks with hemorrhagic transformation will need to continue aspirin and plavix with fresh stent  Monitor need for intubation or hypertonic saline  Palliative care consultation   Stat CT if any changes  Aspirin to 81      COPD (chronic obstructive pulmonary disease) (HCC)  Assessment & Plan  Hx of monitor need for steroids         VTE:  Contraindicated  Ulcer: Not Indicated  Lines: None    I have performed a physical exam and reviewed and updated ROS and Plan today (12/5/2020). In review of yesterday's note (12/4/2020), there are no changes except as  documented above.     Discussed patient condition and risk of morbidity and/or mortality with RN, RT, Pharmacy, Charge nurse / hot rounds, Patient and neurology     The patient remains critically ill with acute hemorrhagic transformation close IV labetalol for blood pressure control.  Critical care time = 38 minutes in directly providing and coordinating critical care and extensive data review.  No time overlap and excludes procedures.

## 2020-12-05 NOTE — CONSULTS
"Reason for PC Consult: Advance Care Planning    Consulted by:   Dr. Hager    Assessment:  General:   65 year old male admitted for stroke on 12/2/20. Pt has an unknown history, he was found down with stroke like symptoms. Pt taken to [facility], CT scan showed thrombus to the left ICA and left basilar artery with acute left MCA stroke symptoms. Pt was transferred to [facility] ED for possible IR intervention. Pt was not a candidate for tPA, pt had emergent, successful, thrombectomy.     Social:   Pt lives with his brother in his home in [place]. Pt was working as a lio up until injury.     Dyspnea: No, 97% on RA  Last BM: 12/04/20    Pain: No    Depression: No    Dementia: No     Spiritual:  Is Anglican or spirituality important for coping with this illness? No, Pt declined visit from   Has a  or spiritual provider visit been requested? No    Palliative Performance Scale: 60%    Advance Directive: None on File   DPOA: None on File, JAZMINE Ruiz  POLST: None on File    To locate the AD/POLST, please hover the cursor over the patient's code status to find all linked ACP documents.    Code Status: Full      Outcome:  PC RN Visited pt at bedside, introduced self and role of PC. Discussed pt's understanding of clinical picture, pt verbalized understanding of having had a heart attack. PC RN educated pt on stroke, pt then verbalized remembering this and he had a stent placed and he is feeling \"much better\". Pt does have some delay responses, speaks softly, and some dysarthria. However, he is able, with time, express himself and engage in GOC discussion.     Discussed pt's current clinical picture, pt verbalized willingness to engage in rehab and eagerness to get back home. Pt reports he was independent at home prior to injury, he was still working and independent with all ADLs. Pt reports he lives with his brother, he does not have children nor has he been . Pt also repot no other " support other than his brother Karan.     Discussed code status, AD and POLST form. Pt states he doesn't want resuscitation nor does he want to live on machines. Discussed DNAR/DNI, pt verbalized understanding and agreement. Pt agreeable to complete AD, pt choose brother Karan Ruiz as DPOA, no alternates, statements of desires #2, 3, & 5, pt also choose to sign declaration. POLST form completed for DNR, comfort focused treatment only and a 3 month trial period of tube feedings. POLST completed, AD awaiting notary, declaration completed.     Notified SW team of notary request.     Left business card on bedside table for pt, encouraged him to call with any questions or concerns.     Active listening, education, validation, normalization, therapeutic touch, and emotional support provided throughout encounter.    Updated:   Dr. Hager    Plan:   DNAR/DNI, AD awaiting notary, Declaration and POLST completed.     Thank you for allowing Palliative Care to participate in this patient's care. Please feel free to call [phone] with any questions or concerns.

## 2020-12-06 PROBLEM — E11.9 TYPE 2 DIABETES MELLITUS, WITHOUT LONG-TERM CURRENT USE OF INSULIN (HCC): Status: ACTIVE | Noted: 2020-12-06

## 2020-12-06 PROCEDURE — A9270 NON-COVERED ITEM OR SERVICE: HCPCS | Performed by: INTERNAL MEDICINE

## 2020-12-06 PROCEDURE — 700102 HCHG RX REV CODE 250 W/ 637 OVERRIDE(OP): Performed by: NURSE PRACTITIONER

## 2020-12-06 PROCEDURE — 700102 HCHG RX REV CODE 250 W/ 637 OVERRIDE(OP): Performed by: PSYCHIATRY & NEUROLOGY

## 2020-12-06 PROCEDURE — 99232 SBSQ HOSP IP/OBS MODERATE 35: CPT | Performed by: NURSE PRACTITIONER

## 2020-12-06 PROCEDURE — A9270 NON-COVERED ITEM OR SERVICE: HCPCS | Performed by: PSYCHIATRY & NEUROLOGY

## 2020-12-06 PROCEDURE — 700102 HCHG RX REV CODE 250 W/ 637 OVERRIDE(OP): Performed by: INTERNAL MEDICINE

## 2020-12-06 PROCEDURE — 99232 SBSQ HOSP IP/OBS MODERATE 35: CPT | Performed by: HOSPITALIST

## 2020-12-06 PROCEDURE — A9270 NON-COVERED ITEM OR SERVICE: HCPCS | Performed by: NURSE PRACTITIONER

## 2020-12-06 PROCEDURE — 770001 HCHG ROOM/CARE - MED/SURG/GYN PRIV*

## 2020-12-06 RX ADMIN — CLOPIDOGREL BISULFATE 75 MG: 75 TABLET ORAL at 05:42

## 2020-12-06 RX ADMIN — ASPIRIN 81 MG: 81 TABLET, COATED ORAL at 05:42

## 2020-12-06 RX ADMIN — AMLODIPINE BESYLATE 10 MG: 10 TABLET ORAL at 17:12

## 2020-12-06 RX ADMIN — ATORVASTATIN CALCIUM 40 MG: 40 TABLET, FILM COATED ORAL at 17:12

## 2020-12-06 RX ADMIN — LISINOPRIL 10 MG: 10 TABLET ORAL at 05:42

## 2020-12-06 ASSESSMENT — ENCOUNTER SYMPTOMS
BLURRED VISION: 0
SORE THROAT: 0
COUGH: 0
HEADACHES: 0
NAUSEA: 0
CHILLS: 0
SPEECH CHANGE: 1
FEVER: 0
SHORTNESS OF BREATH: 0
VOMITING: 0
BACK PAIN: 0
PALPITATIONS: 0
DIZZINESS: 0
WEAKNESS: 1
DIARRHEA: 0
LOSS OF CONSCIOUSNESS: 0
ABDOMINAL PAIN: 0
DOUBLE VISION: 0

## 2020-12-06 NOTE — PROGRESS NOTES
65 y M with L MC s/p thrombectomy and stent complicated by basal ganglia hemorrhage stable on serial images and clinical exam, no prn need for BP, has been up with PT/OT, patient DNR/DNI.     Call with question and concerns    Isaac Hager MD  Critical Care Medicine

## 2020-12-06 NOTE — DISCHARGE PLANNING
[facility] not a benefit of the provider. Chart review indicate potential post acute services. Physiatry to review Monday.

## 2020-12-06 NOTE — PROGRESS NOTES
[facility] Medicine Daily Progress Note    Date of Service  12/6/2020    Chief Complaint  65 y.o. male admitted 12/2/2020 with expressive aphasia    Hospital Course  No significant PMHx.  Sent from [facility]  Presented there after being found unresponsive by room mate in the am.  EMS found him with R side weakness and aphasia, NIH 27.  Not a tPA candidate as unknown last known well.  CTA showing L ICA and basilar artery thrombus.  Sent here for IR thrombectomy and stent which was done on arrival.  Post procedure course complicated by hemorrhagic conversion noted on 12/3.  After discussion with Neuro decision made to cont DAPT in setting of brand new stent.    Interval Problem Update  ROS is slightly limited by some expressive aphasia.  Pt states he feels good.  Was up and walking and feels a little unsteady but no dizziness.    Sinus overnight    Consultants/Specialty   Neuro    Code Status  DNAR/DNI    Disposition  OK to floor  Probable DC home with HH.  Lives with his brother    Review of Systems  Review of Systems   Constitutional: Negative for chills and fever.   HENT: Negative for nosebleeds and sore throat.    Eyes: Negative for blurred vision and double vision.   Respiratory: Negative for cough and shortness of breath.    Cardiovascular: Negative for chest pain, palpitations and leg swelling.   Gastrointestinal: Negative for abdominal pain, diarrhea, nausea and vomiting.   Genitourinary: Negative for dysuria and urgency.   Musculoskeletal: Negative for back pain.   Skin: Negative for rash.   Neurological: Positive for speech change and weakness. Negative for dizziness, loss of consciousness and headaches.        Physical Exam  Temp:  [36.4 °C (97.6 °F)-37.1 °C (98.7 °F)] 36.4 °C (97.6 °F)  Pulse:  [52-83] 59  Resp:  [18-39] 19  BP: (100-159)/(49-73) 123/58  SpO2:  [92 %-98 %] 96 %    Physical Exam  Vitals signs reviewed.   Constitutional:       General: He is not in acute distress.     Appearance: He is  well-developed. He is not diaphoretic.   HENT:      Head: Normocephalic and atraumatic.   Eyes:      Conjunctiva/sclera: Conjunctivae normal.   Neck:      Vascular: No JVD.   Cardiovascular:      Rate and Rhythm: Normal rate.      Heart sounds: No murmur. No gallop.    Pulmonary:      Effort: Pulmonary effort is normal. No respiratory distress.      Breath sounds: No stridor. No wheezing or rales.   Abdominal:      Palpations: Abdomen is soft.      Tenderness: There is no abdominal tenderness. There is no guarding or rebound.   Skin:     General: Skin is warm and dry.      Findings: No rash.   Neurological:      Mental Status: He is oriented to person, place, and time.      Comments: Pt has no evidence of receptive deficits but some word salad.  CN II-XII intact  Mild strength deficit R arm and leg but functional strength   Psychiatric:         Thought Content: Thought content normal.         Fluids    Intake/Output Summary (Last 24 hours) at 12/6/2020 0721  Last data filed at 12/6/2020 0600  Gross per 24 hour   Intake 710 ml   Output 1100 ml   Net -390 ml       Laboratory  Recent Labs     12/04/20  0330 12/05/20  0400   WBC 8.7 8.8   RBC 4.57* 4.75   HEMOGLOBIN 13.7* 14.3   HEMATOCRIT 41.9* 43.4   MCV 91.7 91.4   MCH 30.0 30.1   MCHC 32.7* 32.9*   RDW 47.8 47.1   PLATELETCT 138* 133*   MPV 9.9 10.2     Recent Labs     12/04/20  0330 12/05/20  0400   SODIUM 135 135   POTASSIUM 3.9 3.7   CHLORIDE 103 103   CO2 23 22   GLUCOSE 159* 162*   BUN 17 17   CREATININE 0.86 0.82   CALCIUM 8.8 9.0                   Imaging  EC-ECHOCARDIOGRAM COMPLETE W/ CONT   Final Result      CT-HEAD W/O   Final Result         1.  Hyperdensity in the left basal ganglia with associated mass effect of the left ventricle, stable since prior study, corresponding with hemorrhagic conversion of infarct as documented on MRI performed yesterday.   2.  Changes of atrophy with nonspecific white matter changes, most commonly associated with small  vessel ischemia   3.  Atherosclerosis      MR-BRAIN-W/O   Final Result         1. Age-related cerebral atrophy.      2. Mild periventricular white matter changes consistent with chronic microvascular ischemic gliosis.      3. Moderate sized wedge-shaped area of acute hemorrhagic infarction involving the left basal ganglia and extending into the left frontal periventricular white matter. Further there is moderate effacement of the left frontal horn and body left lateral    ventricle with minimal left to right midline shift of the ventricular system.      4. Additional gyriform and punctate areas of acute infarction are noted in the left frontal parasylvian region.      CT-HEAD W/O   Final Result         1.  Hyperdensity in the left basal ganglia with associated mass effect of the left ventricle, appearance concerning for basal ganglia hemorrhage with surrounding vasogenic edema. Could represent contrast staining from thrombectomy with associated    vasogenic edema however given the degree of mass effect appearance is concerning for hemorrhage.      These findings were discussed with the patient's clinician, Bigg Morfin, on 12/4/2020 4:41 AM.      [id number] VIEW   Final Result      No radiopaque foreign body or implant is identified to preclude MRI evaluation.      IR-THROMBO MECHANICAL ARTERY,INIT   Final Result      Preprocedural findings:   *  Severe atherosclerotic stenosis at the origin of the left internal carotid artery with thrombus.   *  Left M1 thrombus      Intervention:   *  Left carotid angioplasty and stent placement with embolic protection device.   *  Left cervical carotid aspiration thrombectomy.   *  Left M1 segment aspiration thrombectomy.   Post intervention findings:   *  Patent left internal carotid and middle cerebral branches-TICI 3      DX-CHEST-PORTABLE (1 VIEW)   Final Result         1.  No acute cardiopulmonary disease.   2.  Atherosclerosis      CT-CTA HEAD WITH & W/O-POST PROCESS    Final Result         1.  Partial filling defect of the left M1 segment, compatible with partial thrombus. See dedicated CT of the neck for evaluation of the left internal carotid artery      CT-CTA NECK WITH & W/O-POST PROCESSING   Final Result         1.  High-grade stenosis of the proximal left internal carotid artery with filling defect seen extending within the left internal carotid artery, appearance favors thrombus with free-floating segments.   2.  Atherosclerosis of the proximal right internal carotid artery results in approximately 50% stenosis      These findings were discussed with the patient's clinician, Dr. Hyde, on 12/2/2020 9:00 PM.      CT-CEREBRAL PERFUSION ANALYSIS   Final Result         1.  Cerebral blood flow less than 30% likely representing completed infarct = 0 mL.      2.  T Max more than 6 seconds likely representing combination of completed infarct and ischemia = 98 mL.      3.  Mismatched volume likely representing ischemic brain/penumbra = 98 mL      4.  Please note that the cerebral perfusion was performed on the limited brain tissue around the basal ganglia region. Infarct/ischemia outside the CT perfusion sections can be missed in this study.      OUTSIDE IMAGES-DX CHEST   Final Result      OUTSIDE IMAGES-CT HEAD   Final Result      OUTSIDE IMAGES-CT HEAD   Final Result           Assessment/Plan  * Acute ischemic left MCA stroke (HCC)- (present on admission)  Assessment & Plan  L ICA and basilar artery thrombus now s/p thrombectomy and stent with hemorrhagic conversion  Have continued DAPT through [facility] due to high risk of re-occlusion of new stent and thus far has done well  High dose statin  BP control  Has passed SLP eval  cont'd PT/OT/SLP  DAPT: ASA and Clopidogrel  Neuro following  Sinus on Tele    Type 2 diabetes mellitus, without long-term current use of insulin (HCC)  Assessment & Plan  New Dx  Has been well controled on diet  A1c 7.4  Diabetic education  If needed DC on  metformin  Diabetic education consult    Acute bronchitis  Assessment & Plan  Resolved  S/p Doxy    COPD (chronic obstructive pulmonary disease) (HCC)  Assessment & Plan  O2 and RT protocols  Exam benign       VTE prophylaxis: none due to hemorrhagic conversion

## 2020-12-06 NOTE — PROGRESS NOTES
Neurology Progress Note  Neurohospitalist Service, [facility]    Referring Physician: Jack Dos Santos D.O.     Chief Complaint   Patient presents with   • Possible Stroke     Patient a transfer from [facility] with a L ICA occlusion; pt to go to IR       HPI: Refer to initial documented Neurology H&P, as detailed in the patient's chart.    Interval History 12/6/2020: No acute events overnight.  Patient is currently sitting up in chair, awake, alert, fully oriented, following commands. Reports persistent mild dysarthria, mild expressive aphasia, and right arm weakness is unchanged.  Otherwise denies headache, vision changes, numbness, tingling, or confusion.    Past Medical History:   No past medical history on file.     FHx:  No family history on file.     SHx:  Social History     Socioeconomic History   • Marital status: Single     Spouse name: Not on file   • Number of children: Not on file   • Years of education: Not on file   • Highest education level: Not on file   Occupational History   • Not on file   Social Needs   • Financial resource strain: Not on file   • Food insecurity     Worry: Not on file     Inability: Not on file   • Transportation needs     Medical: Not on file     Non-medical: Not on file   Tobacco Use   • Smoking status: Not on file   Substance and Sexual Activity   • Alcohol use: Not on file   • Drug use: Not on file   • Sexual activity: Not on file   Lifestyle   • Physical activity     Days per week: Not on file     Minutes per session: Not on file   • Stress: Not on file   Relationships   • Social connections     Talks on phone: Not on file     Gets together: Not on file     Attends Orthodox service: Not on file     Active member of club or organization: Not on file     Attends meetings of clubs or organizations: Not on file     Relationship status: Not on file   • Intimate partner violence     Fear of current or ex partner: Not on file     Emotionally  abused: Not on file     Physically abused: Not on file     Forced sexual activity: Not on file   Other Topics Concern   • Not on file   Social History Narrative   • Not on file        Medications:    Current Facility-Administered Medications:   •  influenza Vac High-Dose Quad (Fluzone) injection 0.7 mL, 0.7 mL, Intramuscular, Once PRN, Isaac Hager M.D.  •  lisinopril (PRINIVIL) tablet 10 mg, 10 mg, Oral, Q DAY, Isaac Hager M.D., 10 mg at 12/06/20 0542  •  aspirin EC (ECOTRIN) tablet 81 mg, 81 mg, Oral, DAILY, ARLENE Núñez, 81 mg at 12/06/20 0542  •  amLODIPine (NORVASC) tablet 10 mg, 10 mg, Oral, Q DAY, Isaac Hager M.D., 10 mg at 12/05/20 1711  •  hydrALAZINE (APRESOLINE) tablet 25 mg, 25 mg, Oral, Q6HRS PRN, Isaac Hager M.D., 25 mg at 12/05/20 0156  •  clopidogrel (PLAVIX) tablet 75 mg, 75 mg, Oral, DAILY, Isaac Hager M.D., 75 mg at 12/06/20 0542  •  atorvastatin (LIPITOR) tablet 40 mg, 40 mg, Oral, Q EVENING, Geovanny Castro M.D., 40 mg at 12/05/20 1711  •  senna-docusate (PERICOLACE or SENOKOT S) 8.6-50 MG per tablet 2 Tab, 2 Tab, Oral, BID, Stopped at 12/04/20 1800 **AND** polyethylene glycol/lytes (MIRALAX) PACKET 1 Packet, 1 Packet, Oral, QDAY PRN **AND** magnesium hydroxide (MILK OF MAGNESIA) suspension 30 mL, 30 mL, Oral, QDAY PRN **AND** bisacodyl (DULCOLAX) suppository 10 mg, 10 mg, Rectal, QDAY PRN, Bigg Morfin M.D.  •  acetaminophen (Tylenol) tablet 650 mg, 650 mg, Oral, Q6HRS PRN, Bigg E Navjot, M.D.  •  labetalol (NORMODYNE/TRANDATE) injection 10-20 mg, 10-20 mg, Intravenous, Q4HRS PRN, Ysabel Elizondo M.D., 15 mg at 12/05/20 0908    Allergies:  Not on File     Review of systems: In addition to what is detailed in the HPI and interval history above, all other systems reviewed and are negative.    Physical Examination:   Vitals:    12/06/20 0600 12/06/20 0700 12/06/20 0800 12/06/20 1200   BP: 123/58 151/71 115/64 113/56   Pulse: (!) 59 70 (!) 56 68    Resp: 19 (!) 27 19 18   Temp: 36.4 °C (97.6 °F)   36.3 °C (97.4 °F)   TempSrc: Temporal   Temporal   SpO2: 96% 95% 96% 95%   Weight:       Height:         General: Patient in no acute distress, pleasant and cooperative.  HEENT: Normocephalic, no signs of acute trauma.   Neck: Supple, no meningeal signs or carotid bruits. There is normal range of motion. No tenderness on exam.   Chest: Clear to auscultation. No cough.   CV: RRR, no murmurs.   Skin: No signs of acute rashes or trauma.   Musculoskeletal: Joints exhibit full range of motion, without any pain to palpation. There are no signs of joint or muscle swelling. There is no tenderness to deep palpation of muscles.   Psychiatric: No hallucinatory behavior. Denies symptoms of depression or suicidal ideation. Mood and affect appear withdrawn on exam.     NEUROLOGICAL EXAM:  Mental status, orientation: Awake, alert, following commands, and fully oriented.   Speech and language: Speech is mildly dysarthric and fluent with subtle expressive aphasia. The patient is able to name, repeat with paraphasic errors, and comprehend.   Memory: There is intact recollection of recent and remote events.   Cranial nerve exam: Pupils are 3-4 mm bilaterally and equally reactive to light and accommodation. Visual fields are full to field test. There is no nystagmus on primary or secondary gaze. Intact full EOM in all directions of gaze. Face appears asymmetric with right nasolabial fold flattening. Sensation in the face is intact to light touch. Uvula is midline. Palate elevates symmetrically. Tongue is midline and without any signs of tongue biting or fasciculations. Sternocleidomastoid muscles exhibit is normal strength bilaterally. Shoulder shrug is intact bilaterally.   Motor exam: Strength is 5/5 to LUE and LLE.  RUE 4/5 with pronator drift and proximal greater than distal strength. RLE 5/5 both proximally and distally. Tone is normal. No abnormal movements were seen on exam.    Sensory exam reveals normal sense of light touch in all extremities.  Deep tendon reflexes:  2+ throughout. Plantar responses are upgoing to right, downgoing to left. There is no clonus.   Coordination: No ataxia with normal finger-nose-finger to LUE and normal heel-down-shin to BLE.  No ataxia but difficulty to RUE secondary to hemiplegia.  Gait: Deferred per patient preference.    NIH Stroke Scale  12/6/20 11:00    1a. Level of Consciousness (Alert, drowsy, etc): 0= Alert    1b. LOC Questions (Month, age): 0= Answers both correctly    1c. LOC Commands (Open/close eyes make fist/let go): 0= Obeys both correctly    2.   Best Gaze (Eyes open - patient follows examiner's finger on face): 0= Normal    3.   Visual Fields (introduce visual stimulus/threat to patient's field quadrants): 0= No visual loss    4.   Facial Paresis (Show teeth, raise eyebrows and squeeze eyes shut): 1= Minor     5a. Motor Arm - Left (Elevate arm to 90 degrees if patient is sitting, 45 degrees if  supine): 0= No drift    5b. Motor Arm - Right (Elevate arm to 90 degrees if patient is sitting, 45 degrees if supine): 1= Drift    6a. Motor Leg - Left (Elevate leg 30 degrees with patient supine): 0= No drift    6b. Motor Leg - Right  (Elevate leg 30 degrees with patient supine): 0= No drift    7.   Limb Ataxia (Finger-nose, heel down shin): 0= No ataxia    8.   Sensory (Pin prick to face, arm, trunk and leg - compare side to side): 0= Normal    9.  Best Language (Name item, describe a picture and read sentences): 1= Mild to moderate aphasia    10. Dysarthria (Evaluate speech clarity by patient repeating listed words): 1= Mild to moderate slurring    11. Extinction and Inattention (Use information from prior testing to identify neglect or  double simultaneous stimuli testing): 0= No neglect    Total NIH Score: 4        Ancillary Data Reviewed:    Labs:  Lab Results   Component Value Date/Time    PROTHROMBTM 13.5 12/02/2020 08:14 PM    INR 1.00  12/02/2020 08:14 PM      Lab Results   Component Value Date/Time    WBC 8.8 12/05/2020 04:00 AM    RBC 4.75 12/05/2020 04:00 AM    HEMOGLOBIN 14.3 12/05/2020 04:00 AM    HEMATOCRIT 43.4 12/05/2020 04:00 AM    MCV 91.4 12/05/2020 04:00 AM    MCH 30.1 12/05/2020 04:00 AM    MCHC 32.9 (L) 12/05/2020 04:00 AM    MPV 10.2 12/05/2020 04:00 AM    NEUTSPOLYS 77.40 (H) 12/02/2020 08:14 PM    LYMPHOCYTES 14.50 (L) 12/02/2020 08:14 PM    MONOCYTES 7.10 12/02/2020 08:14 PM    EOSINOPHILS 0.20 12/02/2020 08:14 PM    BASOPHILS 0.40 12/02/2020 08:14 PM      Lab Results   Component Value Date/Time    SODIUM 135 12/05/2020 04:00 AM    POTASSIUM 3.7 12/05/2020 04:00 AM    CHLORIDE 103 12/05/2020 04:00 AM    CO2 22 12/05/2020 04:00 AM    GLUCOSE 162 (H) 12/05/2020 04:00 AM    BUN 17 12/05/2020 04:00 AM    CREATININE 0.82 12/05/2020 04:00 AM      Lab Results   Component Value Date/Time    CHOLSTRLTOT 151 12/03/2020 12:30 AM    LDL 81 12/03/2020 12:30 AM    HDL 52 12/03/2020 12:30 AM    TRIGLYCERIDE 92 12/03/2020 12:30 AM       Lab Results   Component Value Date/Time    ALKPHOSPHAT 87 12/03/2020 12:30 AM    ASTSGOT 14 12/03/2020 12:30 AM    ALTSGPT 13 12/03/2020 12:30 AM    TBILIRUBIN 0.5 12/03/2020 12:30 AM        Imaging/Testing:    I interpreted and/or reviewed the patient's neuroimaging    EC-ECHOCARDIOGRAM COMPLETE W/ CONT   Final Result      CT-HEAD W/O   Final Result         1.  Hyperdensity in the left basal ganglia with associated mass effect of the left ventricle, stable since prior study, corresponding with hemorrhagic conversion of infarct as documented on MRI performed yesterday.   2.  Changes of atrophy with nonspecific white matter changes, most commonly associated with small vessel ischemia   3.  Atherosclerosis      MR-BRAIN-W/O   Final Result         1. Age-related cerebral atrophy.      2. Mild periventricular white matter changes consistent with chronic microvascular ischemic gliosis.      3. Moderate sized  wedge-shaped area of acute hemorrhagic infarction involving the left basal ganglia and extending into the left frontal periventricular white matter. Further there is moderate effacement of the left frontal horn and body left lateral    ventricle with minimal left to right midline shift of the ventricular system.      4. Additional gyriform and punctate areas of acute infarction are noted in the left frontal parasylvian region.      CT-HEAD W/O   Final Result         1.  Hyperdensity in the left basal ganglia with associated mass effect of the left ventricle, appearance concerning for basal ganglia hemorrhage with surrounding vasogenic edema. Could represent contrast staining from thrombectomy with associated    vasogenic edema however given the degree of mass effect appearance is concerning for hemorrhage.      These findings were discussed with the patient's clinician, Bigg Morfni, on 12/4/2020 4:41 AM.      [id number] VIEW   Final Result      No radiopaque foreign body or implant is identified to preclude MRI evaluation.      IR-THROMBO MECHANICAL ARTERY,INIT   Final Result      Preprocedural findings:   *  Severe atherosclerotic stenosis at the origin of the left internal carotid artery with thrombus.   *  Left M1 thrombus      Intervention:   *  Left carotid angioplasty and stent placement with embolic protection device.   *  Left cervical carotid aspiration thrombectomy.   *  Left M1 segment aspiration thrombectomy.   Post intervention findings:   *  Patent left internal carotid and middle cerebral branches-TICI 3      DX-CHEST-PORTABLE (1 VIEW)   Final Result         1.  No acute cardiopulmonary disease.   2.  Atherosclerosis      CT-CTA HEAD WITH & W/O-POST PROCESS   Final Result         1.  Partial filling defect of the left M1 segment, compatible with partial thrombus. See dedicated CT of the neck for evaluation of the left internal carotid artery      CT-CTA NECK WITH & W/O-POST PROCESSING   Final  Result         1.  High-grade stenosis of the proximal left internal carotid artery with filling defect seen extending within the left internal carotid artery, appearance favors thrombus with free-floating segments.   2.  Atherosclerosis of the proximal right internal carotid artery results in approximately 50% stenosis      These findings were discussed with the patient's clinician, Dr. Hyde, on 12/2/2020 9:00 PM.      CT-CEREBRAL PERFUSION ANALYSIS   Final Result         1.  Cerebral blood flow less than 30% likely representing completed infarct = 0 mL.      2.  T Max more than 6 seconds likely representing combination of completed infarct and ischemia = 98 mL.      3.  Mismatched volume likely representing ischemic brain/penumbra = 98 mL      4.  Please note that the cerebral perfusion was performed on the limited brain tissue around the basal ganglia region. Infarct/ischemia outside the CT perfusion sections can be missed in this study.      OUTSIDE IMAGES-DX CHEST   Final Result      OUTSIDE IMAGES-CT HEAD   Final Result      OUTSIDE IMAGES-CT HEAD   Final Result          Assessment:    Tejinder Ruiz is a 65 y.o. male with relevant history of vascular risk factors who presented as transfer from [facility] after being found unresponsive with right-sided weakness on 12/2/20 whom neurology was consulted to address acute ischemic stroke secondary to left ICA occlusion seen on CTA imaging at outside hospital. Patient was transferred to [facility] for higher level of care and emergent IR left MCA and ICA thrombectomy and stent placement to left ICA on 12/2/20. Initial NIHSS was 16 now improved to and now stable at 4 today significant for expressive aphasia, mild dysarthria, persistent right nasolabial fold flattening, and RUE pronator drift and hemiparesis. Interval CT head wo contrast on 12/3/20 23:26 revealed hypodensity of the left basal ganglia with associated mass effect concerning for hemorrhage  with surrounding vasogenic edema versus contrast staining from thrombectomy. MRI brain wo contrast was completed 12/4/20 revealed moderate acute left MCA territory infarct with moderate sized acute hemorrhagic transformation of the left basal ganglia extending into the left frontal periventricular white matter with left lateral ventricular effacement and minimal left to right midline shift.  Given neurological exam improved and is now stable and had newly placed stent to allow for thrombectomy to be performed, dual antiplatelet therapy is continued to prevent due to a high risk of reocclusion. Etiology is thromboembolic given known left ICA and left MCA occlusion status post thrombectomy and stent placement.    Plan:    -q4h and PRN neuro assessment. VS per nursing/unit protocol.   -BP goal < 140/90. Antihypertensives per primary team.   -Telemetry; currently SR. Screen for Afib/arrhythmia.   -TTE with bubble study with EF 55%, no gross structural abnormalities, but agitated saline study inconclusive. No evidence suggestive for Afib.  -Continue DAPT with ASA 81 mg PO daily and Plavix 75 mg PO daily for 90 days total status post stent placement.  -Continue Atorvastatin 40 mg PO q HS. Note lipid panel LDL 81 (goal<70) and HDL 52 (within goal).   -Recommend aggressive BG management per primary team. Avoid IVF with Dextrose. BG goal 140-180. Note hemoglobin A1c 7.4 (indicative of DM type II, goal<7).    -PT/OT/SLP eval and treat.  Each discipline recommending postacute placement prior to discharge home.  -Counseled patient at length regarding life style and risk factor modification for secondary stroke prevention, including but not limited to BP management, blood glucose and cholesterol control, medication compliance, smoking cessation, and diet and exercise modification.   -Follow-up outpatient [facility].  Referral placed  -Follow-up outpatient in IR clinic.  -All other medical management per primary team.    -DVT PPX: SCDs.      No further recommendations or further studies from a neurological standpoint at this time. Please re-consult if you have further questions or there is a change in status.      The evaluation of the patient, and recommended management, was discussed with Dr. Loni Hdez, Dr. Abdullahi, and bedside RN. I have performed a physical exam and reviewed and updated ROS and Plan today (12/6/2020). In review of yesterday's note (12/5/2020), there are no changes except as documented above.    SHUN Núñez.   Nurse Practitioner, Neurohospitalist  [facility]  t) [phone] (f) [phone]

## 2020-12-06 NOTE — ASSESSMENT & PLAN NOTE
New Dx  Has been well controled on diet  A1c 7.4  Diabetic education  If needed DC on metformin  Diabetic education consulted

## 2020-12-07 LAB
ANION GAP SERPL CALC-SCNC: 9 MMOL/L (ref 7–16)
BUN SERPL-MCNC: 22 MG/DL (ref 8–22)
CALCIUM SERPL-MCNC: 9.3 MG/DL (ref 8.5–10.5)
CHLORIDE SERPL-SCNC: 109 MMOL/L (ref 96–112)
CO2 SERPL-SCNC: 21 MMOL/L (ref 20–33)
CREAT SERPL-MCNC: 0.89 MG/DL (ref 0.5–1.4)
ERYTHROCYTE [DISTWIDTH] IN BLOOD BY AUTOMATED COUNT: 46.6 FL (ref 35.9–50)
GLUCOSE SERPL-MCNC: 175 MG/DL (ref 65–99)
HCT VFR BLD AUTO: 42.8 % (ref 42–52)
HGB BLD-MCNC: 14.4 G/DL (ref 14–18)
MCH RBC QN AUTO: 30.6 PG (ref 27–33)
MCHC RBC AUTO-ENTMCNC: 33.6 G/DL (ref 33.7–35.3)
MCV RBC AUTO: 91.1 FL (ref 81.4–97.8)
PLATELET # BLD AUTO: 141 K/UL (ref 164–446)
PMV BLD AUTO: 9.8 FL (ref 9–12.9)
POTASSIUM SERPL-SCNC: 4.1 MMOL/L (ref 3.6–5.5)
RBC # BLD AUTO: 4.7 M/UL (ref 4.7–6.1)
SODIUM SERPL-SCNC: 139 MMOL/L (ref 135–145)
WBC # BLD AUTO: 8.8 K/UL (ref 4.8–10.8)

## 2020-12-07 PROCEDURE — 700102 HCHG RX REV CODE 250 W/ 637 OVERRIDE(OP): Performed by: INTERNAL MEDICINE

## 2020-12-07 PROCEDURE — 85027 COMPLETE CBC AUTOMATED: CPT

## 2020-12-07 PROCEDURE — 96105 ASSESSMENT OF APHASIA: CPT

## 2020-12-07 PROCEDURE — 99406 BEHAV CHNG SMOKING 3-10 MIN: CPT | Performed by: PHYSICAL MEDICINE & REHABILITATION

## 2020-12-07 PROCEDURE — 700102 HCHG RX REV CODE 250 W/ 637 OVERRIDE(OP): Performed by: PHYSICAL MEDICINE & REHABILITATION

## 2020-12-07 PROCEDURE — 99223 1ST HOSP IP/OBS HIGH 75: CPT | Mod: 25 | Performed by: PHYSICAL MEDICINE & REHABILITATION

## 2020-12-07 PROCEDURE — 770001 HCHG ROOM/CARE - MED/SURG/GYN PRIV*

## 2020-12-07 PROCEDURE — A9270 NON-COVERED ITEM OR SERVICE: HCPCS | Performed by: PSYCHIATRY & NEUROLOGY

## 2020-12-07 PROCEDURE — 700102 HCHG RX REV CODE 250 W/ 637 OVERRIDE(OP): Performed by: PSYCHIATRY & NEUROLOGY

## 2020-12-07 PROCEDURE — A9270 NON-COVERED ITEM OR SERVICE: HCPCS | Performed by: NURSE PRACTITIONER

## 2020-12-07 PROCEDURE — 99232 SBSQ HOSP IP/OBS MODERATE 35: CPT | Performed by: HOSPITALIST

## 2020-12-07 PROCEDURE — A9270 NON-COVERED ITEM OR SERVICE: HCPCS | Performed by: INTERNAL MEDICINE

## 2020-12-07 PROCEDURE — 700102 HCHG RX REV CODE 250 W/ 637 OVERRIDE(OP): Performed by: NURSE PRACTITIONER

## 2020-12-07 PROCEDURE — A9270 NON-COVERED ITEM OR SERVICE: HCPCS | Performed by: PHYSICAL MEDICINE & REHABILITATION

## 2020-12-07 PROCEDURE — 80048 BASIC METABOLIC PNL TOTAL CA: CPT

## 2020-12-07 RX ADMIN — LISINOPRIL 10 MG: 10 TABLET ORAL at 06:31

## 2020-12-07 RX ADMIN — DOCUSATE SODIUM 50 MG AND SENNOSIDES 8.6 MG 2 TABLET: 8.6; 5 TABLET, FILM COATED ORAL at 17:43

## 2020-12-07 RX ADMIN — CLOPIDOGREL BISULFATE 75 MG: 75 TABLET ORAL at 06:31

## 2020-12-07 RX ADMIN — NYSTATIN 500000 UNITS: 100000 SUSPENSION ORAL at 13:07

## 2020-12-07 RX ADMIN — AMLODIPINE BESYLATE 10 MG: 10 TABLET ORAL at 17:43

## 2020-12-07 RX ADMIN — NYSTATIN 500000 UNITS: 100000 SUSPENSION ORAL at 17:43

## 2020-12-07 RX ADMIN — DOCUSATE SODIUM 50 MG AND SENNOSIDES 8.6 MG 2 TABLET: 8.6; 5 TABLET, FILM COATED ORAL at 06:31

## 2020-12-07 RX ADMIN — ASPIRIN 81 MG: 81 TABLET, COATED ORAL at 06:31

## 2020-12-07 RX ADMIN — ATORVASTATIN CALCIUM 40 MG: 40 TABLET, FILM COATED ORAL at 17:43

## 2020-12-07 RX ADMIN — NYSTATIN 500000 UNITS: 100000 SUSPENSION ORAL at 20:33

## 2020-12-07 ASSESSMENT — ENCOUNTER SYMPTOMS
SHORTNESS OF BREATH: 0
FEVER: 0
DIARRHEA: 0
LOSS OF CONSCIOUSNESS: 0
COUGH: 0
SPEECH CHANGE: 1
PALPITATIONS: 0
VOMITING: 0
CHILLS: 0
DOUBLE VISION: 0
BACK PAIN: 0
ABDOMINAL PAIN: 0
SORE THROAT: 0
NAUSEA: 0
BLURRED VISION: 0
DIZZINESS: 0
HEADACHES: 0
WEAKNESS: 1
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
## 2020-12-07 NOTE — PROGRESS NOTES
[facility] Medicine Daily Progress Note    Date of Service  12/7/2020    Chief Complaint  65 y.o. male admitted 12/2/2020 with expressive aphasia    Hospital Course  No significant PMHx.  [facility] from [facility]  Presented there after being found unresponsive by room mate in the am.  EMS found him with R side weakness and aphasia, NIH 27.  Not a tPA candidate as unknown last known well.  CTA showing L ICA and basilar artery thrombus.  Sent here for IR thrombectomy and stent which was done on arrival.  Post procedure course complicated by hemorrhagic conversion noted on 12/3.  After discussion with Neuro decision made to cont DAPT in setting of brand new stent.    Interval Problem Update  ROS is slightly limited by some expressive aphasia.  Pt states he feels good.  Was up and walking and feels a little unsteady but no dizziness.    Sinus overnight    Consultants/Specialty   Neuro    Code Status  DNAR/DNI    Disposition  OK to floor  Probable DC home with HH.  Lives with his brother    Review of Systems  Review of Systems   Unable to perform ROS: Other   Constitutional: Negative for chills and fever.   HENT: Negative for sore throat.    Eyes: Negative for blurred vision and double vision.   Respiratory: Negative for cough and shortness of breath.    Cardiovascular: Negative for chest pain, palpitations and leg swelling.   Gastrointestinal: Negative for abdominal pain, diarrhea, nausea and vomiting.   Musculoskeletal: Negative for back pain.   Neurological: Positive for speech change and weakness. Negative for dizziness, loss of consciousness and headaches.        Physical Exam  Temp:  [36.1 °C (97 °F)-36.9 °C (98.4 °F)] 36.9 °C (98.4 °F)  Pulse:  [59] 59  Resp:  [15-19] 15  BP: (106-139)/(51-65) 112/59  SpO2:  [95 %-96 %] 96 %    Physical Exam  Vitals signs reviewed.   Constitutional:       General: He is not in acute distress.     Appearance: He is well-developed. He is not diaphoretic.   HENT:      Head:  Normocephalic and atraumatic.   Eyes:      Conjunctiva/sclera: Conjunctivae normal.   Neck:      Vascular: No JVD.   Cardiovascular:      Rate and Rhythm: Normal rate.      Heart sounds: No murmur. No gallop.    Pulmonary:      Effort: Pulmonary effort is normal. No respiratory distress.      Breath sounds: No stridor. No wheezing or rales.   Abdominal:      Palpations: Abdomen is soft.      Tenderness: There is no abdominal tenderness. There is no guarding or rebound.   Musculoskeletal:         General: No tenderness.      Right lower leg: No edema.      Left lower leg: No edema.   Skin:     General: Skin is warm and dry.      Findings: No rash.   Neurological:      Mental Status: He is oriented to person, place, and time.      Comments: Pt has no evidence of receptive deficits but some word salad.  CN II-XII intact  Mild strength deficit R arm and leg but functional strength   Psychiatric:         Mood and Affect: Mood normal.         Behavior: Behavior normal.         Thought Content: Thought content normal.         Fluids    Intake/Output Summary (Last 24 hours) at 12/7/2020 1451  Last data filed at 12/7/2020 1300  Gross per 24 hour   Intake --   Output 750 ml   Net -750 ml       Laboratory  Recent Labs     12/05/20  0400 12/07/20  0401   WBC 8.8 8.8   RBC 4.75 4.70   HEMOGLOBIN 14.3 14.4   HEMATOCRIT 43.4 42.8   MCV 91.4 91.1   MCH 30.1 30.6   MCHC 32.9* 33.6*   RDW 47.1 46.6   PLATELETCT 133* 141*   MPV 10.2 9.8     Recent Labs     12/05/20  0400 12/07/20  0401   SODIUM 135 139   POTASSIUM 3.7 4.1   CHLORIDE 103 109   CO2 22 21   GLUCOSE 162* 175*   BUN 17 22   CREATININE 0.82 0.89   CALCIUM 9.0 9.3                   Imaging  EC-ECHOCARDIOGRAM COMPLETE W/ CONT   Final Result      CT-HEAD W/O   Final Result         1.  Hyperdensity in the left basal ganglia with associated mass effect of the left ventricle, stable since prior study, corresponding with hemorrhagic conversion of infarct as documented on MRI  performed yesterday.   2.  Changes of atrophy with nonspecific white matter changes, most commonly associated with small vessel ischemia   3.  Atherosclerosis      MR-BRAIN-W/O   Final Result         1. Age-related cerebral atrophy.      2. Mild periventricular white matter changes consistent with chronic microvascular ischemic gliosis.      3. Moderate sized wedge-shaped area of acute hemorrhagic infarction involving the left basal ganglia and extending into the left frontal periventricular white matter. Further there is moderate effacement of the left frontal horn and body left lateral    ventricle with minimal left to right midline shift of the ventricular system.      4. Additional gyriform and punctate areas of acute infarction are noted in the left frontal parasylvian region.      CT-HEAD W/O   Final Result         1.  Hyperdensity in the left basal ganglia with associated mass effect of the left ventricle, appearance concerning for basal ganglia hemorrhage with surrounding vasogenic edema. Could represent contrast staining from thrombectomy with associated    vasogenic edema however given the degree of mass effect appearance is concerning for hemorrhage.      These findings were discussed with the patient's clinician, Bigg Morfin, on 12/4/2020 4:41 AM.      [id number] VIEW   Final Result      No radiopaque foreign body or implant is identified to preclude MRI evaluation.      IR-THROMBO MECHANICAL ARTERY,INIT   Final Result      Preprocedural findings:   *  Severe atherosclerotic stenosis at the origin of the left internal carotid artery with thrombus.   *  Left M1 thrombus      Intervention:   *  Left carotid angioplasty and stent placement with embolic protection device.   *  Left cervical carotid aspiration thrombectomy.   *  Left M1 segment aspiration thrombectomy.   Post intervention findings:   *  Patent left internal carotid and middle cerebral branches-TICI 3      DX-CHEST-PORTABLE (1 VIEW)    Final Result         1.  No acute cardiopulmonary disease.   2.  Atherosclerosis      CT-CTA HEAD WITH & W/O-POST PROCESS   Final Result         1.  Partial filling defect of the left M1 segment, compatible with partial thrombus. See dedicated CT of the neck for evaluation of the left internal carotid artery      CT-CTA NECK WITH & W/O-POST PROCESSING   Final Result         1.  High-grade stenosis of the proximal left internal carotid artery with filling defect seen extending within the left internal carotid artery, appearance favors thrombus with free-floating segments.   2.  Atherosclerosis of the proximal right internal carotid artery results in approximately 50% stenosis      These findings were discussed with the patient's clinician, Dr. Hyde, on 12/2/2020 9:00 PM.      CT-CEREBRAL PERFUSION ANALYSIS   Final Result         1.  Cerebral blood flow less than 30% likely representing completed infarct = 0 mL.      2.  T Max more than 6 seconds likely representing combination of completed infarct and ischemia = 98 mL.      3.  Mismatched volume likely representing ischemic brain/penumbra = 98 mL      4.  Please note that the cerebral perfusion was performed on the limited brain tissue around the basal ganglia region. Infarct/ischemia outside the CT perfusion sections can be missed in this study.      OUTSIDE IMAGES-DX CHEST   Final Result      OUTSIDE IMAGES-CT HEAD   Final Result      OUTSIDE IMAGES-CT HEAD   Final Result           Assessment/Plan  * Acute ischemic left MCA stroke (HCC)- (present on admission)  Assessment & Plan  L ICA and basilar artery thrombus now s/p thrombectomy and stent with hemorrhagic conversion  Have continued DAPT through [facility] due to high risk of re-occlusion of new stent and thus far has done well  High dose statin  BP control  Has passed SLP eval  cont'd PT/OT/SLP  DAPT: ASA and Clopidogrel  Neuro following  Sinus on Tele  Have recommended DC to acute rehab however pt declines so  will work on home with  PT/OT/SLP    Type 2 diabetes mellitus, without long-term current use of insulin ([facility])  Assessment & Plan  New Dx  Has been well controled on diet  A1c 7.4  Diabetic education  If needed DC on metformin  Diabetic education consulted    Acute bronchitis  Assessment & Plan  Resolved  S/p Doxy    COPD (chronic obstructive pulmonary disease) ([facility])  Assessment & Plan  O2 and RT protocols  Exam benign       VTE prophylaxis: none due to hemorrhagic conversion

## 2020-12-07 NOTE — FACE TO FACE
Face to Face Supporting Documentation - Home Health    The encounter with this patient was in whole or in part the primary reason for home health admission.    Date of encounter:   Patient:                    MRN:                       YOB: 2020  Tejinder Ruiz  [id number]  1955     Home health to see patient for:  Skilled Nursing care for assessment, interventions & education, Physical Therapy evaluation and treatment, Occupational therapy evaluation and treatment and Speech Language Pathology evaluation and treatment    Skilled need for:  New Onset Medical Diagnosis basilar artery stroke    Skilled nursing interventions to include:  Comment: stroke rehabilitation    Homebound status evidenced by:  Need the aid of supportive devices such as crutches, canes, wheelchairs or walkers. Leaving home requires a considerable and taxing effort. There is a normal inability to leave the home.    Community Physician to provide follow up care: No primary care provider on file.     Optional Interventions? No      I certify the face to face encounter for this home health care referral meets the CMS requirements and the encounter/clinical assessment with the patient was, in whole, or in part, for the medical condition(s) listed above, which is the primary reason for home health care. Based on my clinical findings: the service(s) are medically necessary, support the need for home health care, and the homebound criteria are met.  I certify that this patient has had a face to face encounter by myself.  Jack Dos Santos D.O. - NPI: [id number]

## 2020-12-07 NOTE — THERAPY
"Speech Language Pathology   Initial Assessment     Patient Name: Tejinder Ruiz  AGE:  65 y.o., SEX:  male  Medical Record #: [id number]  Today's Date: 12/7/2020     Precautions  Precautions: Fall Risk, Swallow Precautions ( See Comments)  Comments: L ICA occlusion; L BG hemmorrhage; L MCA CVA     Assessment    Patient is 65 y.o. male admitted 12/1 with profound expresisve aphasia. Imaging revealed left ICA and left basilar artery with acute left MCA stroke symptoms. Status post thrombectomy on 12/2. MRI of brain found.\" Moderate sized wedge-shaped area of acute hemorrhagic infarction involving the left basal ganglia and extending into the left frontal periventricular white matter. Further there is moderate effacement of the left frontal horn and body left lateral ventricle with minimal left to right midline shift of the ventricular system.\"    Pt seen on this date for an aphasia evaluation. Per RN and pt, speech has improved since admit. The Bedside Western Aphasia Battery (WAB) was administered in full and he received the following scores: 8/10 in spontaneous speech:content, 8/10 in spontaneous speech: fluency, 10/10 auditory verbal comprehension, 7/10 in following sequential commands, 9/10 in repetition, 10/10 in naming, 0/10 in writing, and 8/10 apraxia. During reading task, pt with use of frequent paraphasias and perseverations and difficult to follow. Receptive language is his strength and expressive language is mostly intelligible with moments of dysfluency, use of paraphasias, and agrammatic speech. Pt reported that he does not want to go to rehab and when asked why he said \"it (therapy) has been boring\". Pt with very poor insight into deficits and need for therapy. Educated pt on benefits of dysphagia and aphasia therapy and he verbalized understanding and stated that he would be open to receiving therapy in the home health/outpatient setting. At this time, pt would benefit from ongoing aphasia therapy in the " acute care setting and at next level of care. Would benefit from 24 hour supervision post d/c to ensure pt safety and sound decision making.    Plan    Pt would benefit from ongoing aphasia therapy in the acute care setting and at next level of care. Would benefit from 24 hour supervision post d/c to ensure pt safety and sound decision making.    Recommend Speech Therapy 5 times per week until therapy goals are met for the following treatments:  Dysphagia Training, Comprehension Training, Expression Training and Patient / Family / Caregiver Education.    Discharge Recommendations: (P) Recommend post-acute placement for additional speech therapy services prior to discharge home       Objective       12/07/20 1216   Vitals   O2 (LPM) 0   O2 Delivery Device None - Room Air   Pain 0 - 10 Group   Therapist Pain Assessment Post Activity Pain Same as Prior to Activity;Nurse Notified;0   Prior Living Situation   Lives with - Patient's Self Care Capacity Other (Comments)  (brother)   Prior Level Of Function   Communication Within Functional Limits   Swallow Within Functional Limits   Dentition Edentulous   Dentures Uppers;Lowers   Hearing Within Functional Limits for Evaluation   Vision Within Functional Limits for Evaluation   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)   Verbal Expression   Verbal Expression / Aphasia Eval (WDL) X   Verbal Output Automatic Minimal (4)   Verbal Output: Phrases Moderate (3)   Verbal Output Conversation Moderate (3)   Repetition: Single Words Minimal (4)   Repetition: Phrases Minimal (4)   Repetition: Sentences Minimal (4)   Naming Supervision (5)   Dysarthria Minimal (4)   Perseverations Minimal (4)   Word Finding Deficits Minimal (4)   Paraphasias Minimal (4)   Auditory Comprehension   Auditory Comprehension (WDL) X   Yes / No Questions: Personal Information Within Functional Limits (6-7)   Yes / No Questions: General Information Within Functional Limits (6-7)    Yes / No Questions: Abstract Within Functional Limits (6-7)   Follows One Unit Commands Within Functional Limits (6-7)   Follows Two Unit Commands Minimal (4)   Follows Three Unit Commands Moderate (3)   Understands Simple, Structured Conversation  Supervision (5)   Reading Comprehension   Reading Comprehension (WDL) X   Reading Phrases Moderate (3)   Reading Sentences Moderate (3)   Reading Short Paragraphs  Moderate (3)   Written Expression   Written Expression (WDL) X   Functional Writing: Name Moderate (3)   Functional Writing Dictation: Word Moderate (3)   Functional Writing to Dictation: Sentence Severe (2)   Formulates: Sentence Severe (2)   Formulates: Multi Sentence Severe (2)   Overall Legibility Severe (2)   Cognitive-Linguistic   Level of Consciousness Alert   Orientation Level Not Oriented to Reason   Aphasia Compensatory Strategies   Compensatory Strategies Used To Communicate Yes / No Responses   Short Term Goals   Short Term Goal # 1 Patient will consume puree textures (PU4) and mildly thick liquids (MT2) with 1:1 assistance without ovet s/s of aspiration    Short Term Goal # 2 NEW 12/7: Pt will follow three step commands with >60% accuracy and min clinician cues.   Short Term Goal # 3 NEW 12/7: Pt will dictate words/phrases from SLP in writing with >75% accuracy and min clinician cues

## 2020-12-07 NOTE — CONSULTS
Physical Medicine and Rehabilitation Consultation         Initial Consult      Date of initial consultation: 12/7/2020  Consulting provider: Geovanny Castro MD  Reason for consultation: assess for acute inpatient rehab appropriateness  LOS: 5 Day(s)    Chief complaint: Left MCA stroke    HPI: The patient is a 65 y.o. right hand dominant male with a past medical history of tobacco abuse;  who presented as a transfer from [facility] on 12/2/2020  7:58 PM with acute left ICA occlusion seen on CTA imaging.  Patient was found unresponsive on the ground with right-sided deficits, brought to [facility], evaluated to have NIH of 27 at outside hospital, improved NIH of 16 at [facility] ED as assessed by neurology.  Patient was taken for thrombectomy with TICI 3 flow restoration. Now with persistent mild dysarthria, expressive aphasia, right arm weakness.  NIH score 4 on 12/6.  MRI brain showed acute left MCA territory infarct with moderate sized acute hemorrhagic transformation of the left basal ganglia extending into the left frontal periventricular white matter with left lateral ventricular effacement and minimal left to right midline shift.  Patient had stent placed to allow for thrombectomy, patient is on DAPT to prevent reocclusion.    The patient currently reports some difficulty speaking and some weakness but otherwise doing fairly well. His preference is to go straight home and not come to rehab.     Social Hx:  1 SH  3 REENA  With: Brother, who owns home and works during daytime but able to assist with IADLs    Employment: [employer]   Tobacco: 1 ppd   Alcohol: denies   Drugs: some MJ use     THERAPY:  PT: Functional mobility   12/5: Walking 100 feet at hand-held assist/min assist level; min assist for transfers    OT: ADLs  12/5: Max assist lower body dressing      SLP:   12/3: Dysphagia    IMAGING:  MRI brain 12/4  1. Age-related cerebral atrophy.     2. Mild periventricular white matter changes consistent with  "chronic microvascular ischemic gliosis.     3. Moderate sized wedge-shaped area of acute hemorrhagic infarction involving the left basal ganglia and extending into the left frontal periventricular white matter. Further there is moderate effacement of the left frontal horn and body left lateral   ventricle with minimal left to right midline shift of the ventricular system.     4. Additional gyriform and punctate areas of acute infarction are noted in the left frontal parasylvian region.    PROCEDURES:  12/2 Fish Underwood MD   Left ICA stent and thrombectomy and MCA thrombectomy  TICI 3     PMH:  No past medical history on file.    PSH:  No past surgical history on file.    FHX:  Non-pertinent to today's issues    Medications:  Current Facility-Administered Medications   Medication Dose   • influenza Vac High-Dose Quad (Fluzone) injection 0.7 mL  0.7 mL   • lisinopril (PRINIVIL) tablet 10 mg  10 mg   • aspirin EC (ECOTRIN) tablet 81 mg  81 mg   • amLODIPine (NORVASC) tablet 10 mg  10 mg   • hydrALAZINE (APRESOLINE) tablet 25 mg  25 mg   • clopidogrel (PLAVIX) tablet 75 mg  75 mg   • atorvastatin (LIPITOR) tablet 40 mg  40 mg   • senna-docusate (PERICOLACE or SENOKOT S) 8.6-50 MG per tablet 2 Tab  2 Tab    And   • polyethylene glycol/lytes (MIRALAX) PACKET 1 Packet  1 Packet    And   • magnesium hydroxide (MILK OF MAGNESIA) suspension 30 mL  30 mL    And   • bisacodyl (DULCOLAX) suppository 10 mg  10 mg   • acetaminophen (Tylenol) tablet 650 mg  650 mg   • labetalol (NORMODYNE/TRANDATE) injection 10-20 mg  10-20 mg       Allergies:  Not on File    Physical Exam:  Vitals: /59   Pulse (!) 59   Temp 36.9 °C (98.4 °F) (Temporal)   Resp 15   Ht 1.753 m (5' 9\")   Wt 83 kg (182 lb 15.7 oz)   SpO2 95%   Gen: NAD  Head: NC/AT  Eyes/ Nose/ Mouth: PERRLA, moist mucous membranes. White plaquing on tongue.  Cardio: RRR, good distal perfusion, warm extremities  Pulm: normal respiratory effort, no cyanosis   Abd: " Soft NTND, negative borborygmi   Ext: No peripheral edema. No calf tenderness. No clubbing.    Mental status: follows commands  Speech: fluent, no aphasia or dysarthria    CRANIAL NERVES:  2,3: visual acuity grossly intact, PERRL  3,4,6: EOMI bilaterally, no nystagmus or diplopia  5: sensation intact to light touch bilaterally and symmetric  7: no facial asymmetry  8: hearing grossly intact  9,10: symmetric palate elevation  11: SCM/Trapezius strength 5/5 bilaterally  12: tongue protrudes midline      Motor:      Upper Extremity  Myotome R L   Shoulder flexion C5 4/5 5   Elbow flexion C5 4/5 5   Wrist extension C6 2/5 5   Elbow extension C7 4/5 5   Finger flexion C8 4/5 5   Finger abduction T1 1/5 5     Lower Extremity Myotome R L   Hip flexion L2 4/5 5   Knee extension L3 4/5 5   Ankle dorsiflexion L4 4/5 5   Toe extension L5 4/5 5   Ankle plantarflexion S1 4/5 5     Sensory:   intact to light touch through out    DTRs: 2+ in bilateral  brachioradialis, 2+ in bilateral patellar tendons    Tone: no spasticity noted, no cogwheeling noted    Coordination:   Altered finger isreal right    Labs: Reviewed and significant for   Recent Labs     12/05/20  0400 12/07/20  0401   RBC 4.75 4.70   HEMOGLOBIN 14.3 14.4   HEMATOCRIT 43.4 42.8   PLATELETCT 133* 141*     Recent Labs     12/05/20  0400 12/07/20  0401   SODIUM 135 139   POTASSIUM 3.7 4.1   CHLORIDE 103 109   CO2 22 21   GLUCOSE 162* 175*   BUN 17 22   CREATININE 0.82 0.89   CALCIUM 9.0 9.3     Recent Results (from the past 24 hour(s))   Basic Metabolic Panel    Collection Time: 12/07/20  4:01 AM   Result Value Ref Range    Sodium 139 135 - 145 mmol/L    Potassium 4.1 3.6 - 5.5 mmol/L    Chloride 109 96 - 112 mmol/L    Co2 21 20 - 33 mmol/L    Glucose 175 (H) 65 - 99 mg/dL    Bun 22 8 - 22 mg/dL    Creatinine 0.89 0.50 - 1.40 mg/dL    Calcium 9.3 8.5 - 10.5 mg/dL    Anion Gap 9.0 7.0 - 16.0   CBC WITHOUT DIFFERENTIAL    Collection Time: 12/07/20  4:01 AM   Result Value  Ref Range    WBC 8.8 4.8 - 10.8 K/uL    RBC 4.70 4.70 - 6.10 M/uL    Hemoglobin 14.4 14.0 - 18.0 g/dL    Hematocrit 42.8 42.0 - 52.0 %    MCV 91.1 81.4 - 97.8 fL    MCH 30.6 27.0 - 33.0 pg    MCHC 33.6 (L) 33.7 - 35.3 g/dL    RDW 46.6 35.9 - 50.0 fL    Platelet Count 141 (L) 164 - 446 K/uL    MPV 9.8 9.0 - 12.9 fL   ESTIMATED GFR    Collection Time: 12/07/20  4:01 AM   Result Value Ref Range    GFR If African American >60 >60 mL/min/1.73 m 2    GFR If Non African American >60 >60 mL/min/1.73 m 2         ASSESSMENT:  Patient is a 65 y.o. male admitted with left MCA infarct now s/p thrombectomy with TICI 3 flow      [facility] Code / Diagnosis to Support: 0001.2 - Stroke: Right Body Involvement (Left Brain)    Rehabilitation: Impaired ADLs and mobility    Patient refusing rehab at this time. He would like to DC directly home. I encouraged him to consider IPR. If so, he is a good candidate for inpatient rehab based on needs for PT, OT, and speech therapy.  Patient will also benefit from family training.  Patient has a good discharge situation which will be home with Brother.     Barriers to transfer include: Insurance authorization, TCCs to verify disposition, medical clearance and bed availability     Additional Recommendations:  - Continue PT/OT and SLP while in house   - Patient is walking 100' with PT which is close to household distances. He will need to improve to min assist with OT prior to DC. Strongly recommend 24/7 support on DC for at least a few weeks given his dysarthria and dysphagia   - Would appreciate updated SLP eval for swallow function and cog eval.   - Nystatin ordered for presumed candida on tongue   - Tobacco abuse cessation counseling given today for ~5 min as it pertains to vascular disease, heart attack, stroke, wound healing, and pulmonary disease.   - Continue ASA/ Plavix/ statin per Neurology recs    Medical Complexity:  stroke      DVT PPX: SCDs      Thank you for allowing us to participate in  the care of this patient.     Patient was seen for 81 minutes on unit/floor of which > 50% of time was spent on counseling and coordination of care regarding the above, including prognosis, risk reduction, benefits of treatment, and options for next stage of care.    Ovidio Pena, DO   Physical Medicine and Rehabilitation

## 2020-12-07 NOTE — DISCHARGE PLANNING
Action:   · RN CM faxed notarized advance directive to JESÚS Wilson, at [phone] and requested that it be scanned into EMR  · Fax receipt received         Plan:   · F/U with [facility]   · Hospital Care Management Team to continue to provide support services and assistance with discharge planning as needed.

## 2020-12-07 NOTE — DISCHARGE PLANNING
Hospital Care Management Discharge Planning       Anticipated Discharge Disposition:   · Home with HH     Action:   · This RN MARY met with the Pt and his brother and DPOA, Karan, to discuss HH choice   · Choice made for 1. [facility] 2. [facility]  · Verbal consent received to send referral   · Pt and Karan state that Pt does not have a PCP but would like to establish with Dr. Jossue Hancock at [facility]   · RN MARY attempted to schedule new [place] appointment [place] [facility] [phone]  · No answer, VM states 48-72 hour call back time frame  · VM left requesting a call back  · Choice form faxed to JESÚS Wilson, at [phone]  · Fax receipt received      Barriers to Discharge:   · HH acceptance   · Medical clearance      Plan:   · F/U with [facility]   · Hospital Care Management Team to continue to provide support services and assistance with discharge planning as needed.

## 2020-12-08 PROBLEM — J20.9 ACUTE BRONCHITIS: Status: RESOLVED | Noted: 2020-12-02 | Resolved: 2020-12-08

## 2020-12-08 LAB
ANION GAP SERPL CALC-SCNC: 12 MMOL/L (ref 7–16)
BUN SERPL-MCNC: 21 MG/DL (ref 8–22)
CALCIUM SERPL-MCNC: 9.4 MG/DL (ref 8.5–10.5)
CHLORIDE SERPL-SCNC: 105 MMOL/L (ref 96–112)
CO2 SERPL-SCNC: 20 MMOL/L (ref 20–33)
CREAT SERPL-MCNC: 0.9 MG/DL (ref 0.5–1.4)
GLUCOSE SERPL-MCNC: 174 MG/DL (ref 65–99)
POTASSIUM SERPL-SCNC: 4.7 MMOL/L (ref 3.6–5.5)
SODIUM SERPL-SCNC: 137 MMOL/L (ref 135–145)

## 2020-12-08 PROCEDURE — A9270 NON-COVERED ITEM OR SERVICE: HCPCS | Performed by: INTERNAL MEDICINE

## 2020-12-08 PROCEDURE — A9270 NON-COVERED ITEM OR SERVICE: HCPCS | Performed by: NURSE PRACTITIONER

## 2020-12-08 PROCEDURE — 700102 HCHG RX REV CODE 250 W/ 637 OVERRIDE(OP): Performed by: INTERNAL MEDICINE

## 2020-12-08 PROCEDURE — 80048 BASIC METABOLIC PNL TOTAL CA: CPT

## 2020-12-08 PROCEDURE — 97116 GAIT TRAINING THERAPY: CPT

## 2020-12-08 PROCEDURE — 92526 ORAL FUNCTION THERAPY: CPT

## 2020-12-08 PROCEDURE — 99239 HOSP IP/OBS DSCHRG MGMT >30: CPT | Performed by: HOSPITALIST

## 2020-12-08 PROCEDURE — 700102 HCHG RX REV CODE 250 W/ 637 OVERRIDE(OP): Performed by: PHYSICAL MEDICINE & REHABILITATION

## 2020-12-08 PROCEDURE — 700102 HCHG RX REV CODE 250 W/ 637 OVERRIDE(OP): Performed by: NURSE PRACTITIONER

## 2020-12-08 PROCEDURE — A9270 NON-COVERED ITEM OR SERVICE: HCPCS | Performed by: PHYSICAL MEDICINE & REHABILITATION

## 2020-12-08 RX ORDER — LISINOPRIL 10 MG/1
10 TABLET ORAL DAILY
Qty: 30 TAB | Refills: 0 | Status: SHIPPED | OUTPATIENT
Start: 2020-12-09

## 2020-12-08 RX ORDER — CLOPIDOGREL BISULFATE 75 MG/1
75 TABLET ORAL DAILY
Qty: 30 TAB | Refills: 0 | Status: SHIPPED | OUTPATIENT
Start: 2020-12-09

## 2020-12-08 RX ORDER — ASPIRIN 81 MG/1
81 TABLET ORAL DAILY
Qty: 30 TAB | COMMUNITY
Start: 2020-12-09

## 2020-12-08 RX ORDER — ATORVASTATIN CALCIUM 40 MG/1
40 TABLET, FILM COATED ORAL EVERY EVENING
Qty: 30 TAB | Refills: 0 | Status: SHIPPED | OUTPATIENT
Start: 2020-12-08

## 2020-12-08 RX ORDER — AMLODIPINE BESYLATE 10 MG/1
10 TABLET ORAL DAILY
Qty: 30 TAB | Refills: 0 | Status: SHIPPED | OUTPATIENT
Start: 2020-12-08

## 2020-12-08 RX ADMIN — CLOPIDOGREL BISULFATE 75 MG: 75 TABLET ORAL at 06:07

## 2020-12-08 RX ADMIN — NYSTATIN 500000 UNITS: 100000 SUSPENSION ORAL at 08:37

## 2020-12-08 RX ADMIN — ASPIRIN 81 MG: 81 TABLET, COATED ORAL at 06:07

## 2020-12-08 RX ADMIN — LISINOPRIL 10 MG: 10 TABLET ORAL at 06:07

## 2020-12-08 RX ADMIN — DOCUSATE SODIUM 50 MG AND SENNOSIDES 8.6 MG 2 TABLET: 8.6; 5 TABLET, FILM COATED ORAL at 06:07

## 2020-12-08 ASSESSMENT — COGNITIVE AND FUNCTIONAL STATUS - GENERAL
MOBILITY SCORE: 16
CLIMB 3 TO 5 STEPS WITH RAILING: A LOT
WALKING IN HOSPITAL ROOM: A LITTLE
MOVING FROM LYING ON BACK TO SITTING ON SIDE OF FLAT BED: A LOT
SUGGESTED CMS G CODE MODIFIER MOBILITY: CK
TURNING FROM BACK TO SIDE WHILE IN FLAT BAD: A LITTLE
MOVING TO AND FROM BED TO CHAIR: A LITTLE
STANDING UP FROM CHAIR USING ARMS: A LITTLE

## 2020-12-08 ASSESSMENT — GAIT ASSESSMENTS
GAIT LEVEL OF ASSIST: MINIMAL ASSIST
DEVIATION: OTHER (COMMENT);DECREASED BASE OF SUPPORT
DISTANCE (FEET): 200

## 2020-12-08 NOTE — PROGRESS NOTES
Pt has discharge orders home as refusing rehab. Reached out to therapies to see pt prior to discharging home to help optimize his safety as pt is impulsive and unsteady at times.  Also currently still on a puree mildly thick liquid diet.

## 2020-12-08 NOTE — PALLIATIVE CARE
To locate the AD/POLST, please hover the cursor over the patient's code status to find all linked ACP documents. Pt selected DNR, comfort focused treatment, artificial nutrition/feeding tune trial no longer then 3 months. Please give POLST to pt at time of D/c. It will be found in the hard chart. Thank you.

## 2020-12-08 NOTE — DISCHARGE PLANNING
Agency/Facility Name: [facility]  Spoke To: Abbi  Outcome: [facility] called to f/u, made aware pt will be going home today.  [facility] will contact [facility] or CM when they have an answer.

## 2020-12-08 NOTE — DISCHARGE PLANNING
Hospital Care Management Discharge Planning       Anticipated Discharge Disposition:   · Home with HH     Action:   · This RN CM requested JESÚS Wilson, to follow up with HH agencies as Pt is discharging today  · Ginger to follow up and let this RN know status of referral      Barriers to Discharge:   · HH acceptance      Plan:   · Continue to f/u with HH agencies    · Hospital Care Management Team to continue to provide support services and assistance with discharge planning as needed.

## 2020-12-08 NOTE — THERAPY
Speech Language Pathology  Daily Treatment     Patient Name: Tejinder Ruiz  Age:  65 y.o., Sex:  male  Medical Record #: [id number]  Today's Date: 12/8/2020     Precautions  Precautions: Fall Risk, Swallow Precautions ( See Comments)  Comments: L ICA occlusion; L BG hemmorrhage; L MCA CVA     Assessment    The patient was seen on this date for PO trials of thin liquids (TN0) and soft and bite sized (SB6) textures. The patient was noted to present with some impulsivity taking large bites of bolus. The patient was able to consume smaller bites of PO independently when prompted. Prolonged mastication was appreciated, however chewing was functional and swallow trigger was timely. He consumed thin liquids via cup sip without any overt s/sx of aspiration. Vocal quality remained clear throughout the therapy session. Recommend upgrade to soft and bite sized (SB6) and thin liquids (TN0).     Per RN, the patient does not wish to pursue post-acute placement. In order for the patient to discharge home safely, please continue to provide soft and bite sized foods, with cues to take small bites and sips, slow his rate of pace and limit distractions during meals. The patient should refrain from straws and should take single sips of liquids.     Plan    Continue current treatment plan.    Discharge Recommendations: (P) Recommend post-acute placement for additional speech therapy services prior to discharge home       Objective       12/08/20 0946   Dysphagia    Positioning / Behavior Modification Self Monitoring;Modulate Rate or Bite Size   Other Treatments PO trials of thin liquids and soft and bite sized    Diet / Liquid Recommendation Thin (0);Soft & Bite-Sized (6) - (Dysphagia III)   Nutritional Liquid Intake Rating Scale Non thickened beverages   Nutritional Food Intake Rating Scale Total oral diet with multiple consistencies but requiring special preparation or compensations   Nursing Communication Swallow Precaution Sign Posted  at Head of Bed   Recommended Route of Medication Administration   Medication Administration  Whole with Liquid Wash

## 2020-12-08 NOTE — DISCHARGE INSTRUCTIONS
Discharge Instructions    Discharged to home by car with relative. Discharged via wheelchair, hospital escort: Yes.  Special equipment needed: Not Applicable    Be sure to schedule a follow-up appointment with your primary care doctor or any specialists as instructed.     Discharge Plan:   Diet Plan: Discussed  Activity Level: Discussed  Confirmed Follow up Appointment: No (Comments)(Home Health to arrange.)  Confirmed Symptoms Management: Discussed  Medication Reconciliation Updated: Yes  Influenza Vaccine Indication: Patient Refuses    I understand that a diet low in cholesterol, fat, and sodium is recommended for good health. Unless I have been given specific instructions below for another diet, I accept this instruction as my diet prescription.     Other diet: Soft and bite sized foods and thin liquids.  Take small bites and sips, make sure your pace of eating is slow and limit distractions during meals. No straws and should take single sips of liquids.       Special Instructions: PLEASE BE SAFE!!!!  Therapy recommends 24 hour supervision.  Brandon is a very high fall risk, right side is weaker then the left and tends to lean and fall to the right side.  Home health will set up physical therapy, occupational therapy, and speech therapy for patient.         · Is patient discharged on Warfarin / Coumadin?   No     Depression / Suicide Risk    As you are discharged from this [facility] facility, it is important to learn how to keep safe from harming yourself.    Recognize the warning signs:  · Abrupt changes in personality, positive or negative- including increase in energy   · Giving away possessions  · Change in eating patterns- significant weight changes-  positive or negative  · Change in sleeping patterns- unable to sleep or sleeping all the time   · Unwillingness or inability to communicate  · Depression  · Unusual sadness, discouragement and loneliness  · Talk of wanting to die  · Neglect of personal  appearance   · Rebelliousness- reckless behavior  · Withdrawal from people/activities they love  · Confusion- inability to concentrate     If you or a loved one observes any of these behaviors or has concerns about self-harm, here's what you can do:  · Talk about it- your feelings and reasons for harming yourself  · Remove any means that you might use to hurt yourself (examples: pills, rope, extension cords, firearm)  · Get professional help from the community (Mental Health, Substance Abuse, psychological counseling)  · Do not be alone:Call your Safe Contact- someone whom you trust who will be there for you.  · Call your local CRISIS HOTLINE 259-3145 or 539-735-8014  · Call your local Children's Mobile Crisis Response Team [place] (574) 545-7288 or www.[employer]  · Call the toll free National Suicide Prevention Hotlines   · National Suicide Prevention Lifeline 874-974-CJTE (6929)  · [employer] Line Network 800-SUICIDE (612-7210)            Rehabilitation After a Stroke, Adult  A stroke causes damage to the brain cells, which can affect your ability to walk, talk, or remember things. The impact of a stroke is different for everyone, and so is recovery. Some people have progress during the first few days after treatment. Others may take weeks or longer to make progress. Stroke rehabilitation includes a variety of treatments to help you recover and promote your independence after a stroke. You may not be able do everything that you did before the stroke, but you can learn ways to manage your lifestyle and be as independent as possible. Rehabilitation will start as soon as you are able to participate after your stroke, and it involves care from a team that may include:  · Family and friends. Your loved ones know you best and can be very helpful in your recovery.  · Physicians.  · Nurses.  · Physical and occupational therapists.  · Speech-language therapists.  · A nutritionist.  · A psychologist.  · A  .  Keep open communication with all members of your care team. Share your medical records if needed, and take notes about each provider's recommendations.  What is physical therapy?  Physical therapists (PTs) help you to improve your coordination, balance, and muscle strength. Physical therapy may involve:  · Range of motion exercises.  · Help to move between lying, sitting, and standing positions.  · Walking with a cane or walker, if needed.  · Help using stairs.  What is occupational therapy?  Occupational therapists (OTs) help you rebuild your ability to do everyday tasks, such as brushing your teeth, going to the bathroom, eating, and getting dressed. Occupational therapy may also help with:  · Vision. Visual scanning is a technique that is used to prevent falls.  · Memory and cognitive training. This therapy includes problem-solving techniques and relearning tasks like making a phone call.  · Fine muscle movements such as buttoning a shirt or picking up small objects.  What is speech therapy?  Speech-language therapists help you communicate. After a stroke, you may have problems understanding what people are saying, or you may have trouble writing, speaking, or finding the right word for what you want to say. You may also need speech therapy if you have difficulty swallowing while eating and drinking. Examples of speech-language therapies include:  · Techniques to strengthen muscles used in swallowing.  · Naming objects or describing pictures. This helps retrain the brain to recognize and remember words.  · Exercises to strengthen the muscles involved in talking, including your tongue and lips.  · Exercises to retrain your brain in understanding what you read and hear.  How often will I need therapy?  Therapy will begin as soon as you are able to participate, which is often within the first few days after a stroke. Sessions will be frequent at first. For example, you may have therapy 2-3 hours a  day on most days of the week during the first few months. The intensity depends on the type and severity of your stroke. You may need therapy for several months. Therapy may take place in the hospital, at a rehabilitation center, or in your home.  Are there any side effects of therapy?  Therapy is safe and is usually well-tolerated. You may feel physically and mentally tired after therapy, especially during the first few weeks. Rest before therapy sessions if you need to so you can get the most out of your rehabilitation.  Follow these instructions at home:  · Involve your family and friends in your recovery, if possible. Having another person to encourage you is beneficial.  · Follow instructions from your speech-language therapist, nutritionist, or health care provider about what you can safely eat and drink. Eat healthy foods. If your ability to swallow was affected by the stroke, you may need to take steps to avoid choking, such as:  ? Taking small bites when eating.  ? Eating foods that are soft or pureed.  ? Drinking liquids that have been thickened.  · Maintain social connections and interactions with friends, family, and community groups. This is an important part of your recovery. Communication challenges and physical challenges may cause you to feel isolated after a stroke.  · Consider joining a support group that allows you to talk about the impact of stroke on your life. A psychologist or counselor may be recommended. Your emotional recovery from stroke is just as important as your physical recovery.  · Keep all follow-up visits as told by your health care providers. This is important.  Summary  · Stroke rehabilitation includes a variety of treatments to help you recover and promote your independence after a stroke.  · Rehabilitation will start as soon as you are able to participate after your stroke, and it includes care from a team of experts.  · The intensity of therapy depends on the type and  severity of your stroke. You may need therapy for several months.  This information is not intended to replace advice given to you by your health care provider. Make sure you discuss any questions you have with your health care provider.  Document Released: 01/06/2009 Document Revised: 04/08/2020 Document Reviewed: 12/19/2017  Elsevier Patient Education © 2020 Elsevier Inc.          Medicines After an Ischemic Stroke  After a stroke, your health care provider may prescribe one or more types of medicine. Some of these medicines help to keep your blood from clotting, which may prevent future strokes. Other medicines may be used to help you manage health problems that can increase your risk of another stroke.  It is important to take all over-the-counter and prescription medicines only as told by your health care provider.  What are some commonly prescribed medicines after a stroke?    · Antiplatelet and anticoagulant medicines. They are also called blood thinners. Ischemic strokes are caused by blood clots that block an artery. These medicines help to keep your blood from clotting easily.  · Blood pressure medicines. High blood pressure can increase your risk of stroke. These medicines help to lower your blood pressure.  · Cholesterol medicines. Many strokes happen because blood vessels become clogged with plaque. This plaque buildup is caused by high levels of cholesterol in the blood. These medicines can help to lower cholesterol levels and lower your risk of another stroke.  What should I know about antiplatelet medicines?  · When a person starts to bleed, blood platelets release a chemical (thromboxane) that tells blood to clot. Antiplatelet medicines help to limit the amount of this chemical that is released. This makes it harder for your blood to clot, which can lower your risk of another stroke. The most common antiplatelet medicine is aspirin.  · These medicines may help reduce the negative effects of a stroke  if they are taken shortly after the stroke happens. You may need to continue taking them on a long-term basis to help prevent a stroke in the future.  · You may need to take more than one antiplatelet medicine (dual antiplatelet therapy).  · People with certain health problems--such as gastrointestinal disease, bleeding disorders, or a history of kidney or liver disease--may not be able to take antiplatelet medicines.  · These medicines can increase the risk of bleeding because they work to keep blood from clotting. You may need to take certain precautions, such as:  ? Using a soft-bristle toothbrush.  ? Being careful when handling knives or razors.  ? Avoiding activities that could cause injury or bruising, and following instructions about how to prevent falls.  · Talk with your health care provider before you take any medicines that contain NSAIDs. These medicines increase your risk for dangerous bleeding.  · Take your medicine exactly as told, at the same time every day.  What should I know about anticoagulant medicines?  · Anticoagulant medicines keep the liver from making certain blood-clotting proteins. This makes it hard for your blood to clot, which can lower your risk of another stroke.  · These medicines are used to treat strokes that were caused by blood clots that formed in the heart. They are also used to treat people with an irregular heartbeat (atrial fibrillation) who have had a stroke.  · Certain anticoagulant medicines require regular blood tests to check that you are taking the right dosage.  · Some of these medicines can increase the risk of bleeding problems. When taking these medicines, you will need to take precautions such as:  ? Holding pressure over any cuts for longer than usual.  ? Avoiding contact sports or other activities that may cause trauma or injury.  ? Telling your dentist and other health care providers that you are taking anticoagulants before you have any procedures that may  cause bleeding.  · These medicines can sometimes be affected by certain vitamins and foods. Talk with your health care provider about which foods, vitamins, and medicines you should avoid.  · Talk with your health care provider before you take any medicines that contain aspirin or NSAIDs. These medicines increase your risk for dangerous bleeding.  · Take your medicine exactly as told, at the same time every day.  What should I know about cholesterol-lowering medicines?    · Some cholesterol medicines (statins) stop cholesterol from forming in the liver. This prevents bad cholesterol (LDL) from getting into the blood and clogging arteries. These medicines can also help to lower blood fats (triglycerides) and raise good cholesterol (HDL) levels.  · These medicines may cause muscle and liver problems. You may need regular blood tests to check the level of these medicines in your liver.  · There is a small risk that these medicines can increase your chance of developing type 2 diabetes.  · Grapefruit and pomegranate juices can affect how these medicines work. Talk with your health care provider about which foods, vitamins, and medicines you should avoid.  What should I know about blood pressure medicines?  · There are many different types of blood pressure medicines. You may need to take more than one type to lower your blood pressure.  · It can sometimes take time to find the right dosage of medicine.  · You will likely have to take blood pressure medicines for the rest of your life.  · Some over-the-counter medicines, such as cold and flu medicines, can affect blood pressure. Talk with your health care provider about which vitamins, supplements, and medicines you should avoid.  What questions should I ask my health care provider?  · How do these medicines work?  · What precautions should I take while on these medicines?  · Can I drink alcohol with these medicines?  · What are the risks of taking these  medicines?  · What are some possible side effects of taking these medicines?  · What follow-up tests do I need?  · What else can I do to lower my risk of another stroke?  Get help right away if:  · You have any signs of unusual bleeding, such as bleeding from your gums, blood in your urine, bloody or dark stool, nosebleeds, or vomiting blood.  · You fall and hit your head while taking blood-thinning medicines.  Summary  · After a stroke, your health care provider may prescribe medicines that make it hard for your blood to clot, which can help to prevent future strokes.  · Your health care provider may instruct you to take other medicines to help manage health conditions that can increase your risk of another stroke.  · It is important to take all over-the-counter and prescription medicines only as told by your health care provider.  · Medicines that are commonly prescribed after a stroke include blood thinners, blood pressure medicines, and cholesterol medicines.  · Talk with your health care provider about what precautions you should take.  This information is not intended to replace advice given to you by your health care provider. Make sure you discuss any questions you have with your health care provider.  Document Released: 04/06/2018 Document Revised: 01/19/2019 Document Reviewed: 04/06/2018  [employer] Patient Education © 2020 [employer]    Eating Plan After Stroke  A stroke causes damage to the brain cells, which can affect your ability to walk, talk, and even eat. The impact of a stroke is different for everyone, and so is recovery. A good nutrition plan is important for your recovery. It can also lower your risk of another stroke.  If you have difficulty chewing and swallowing your food, a dietitian or your stroke care team can help so that you can enjoy eating healthy foods.  What are tips for following this plan?    Reading food labels  · Choose foods that have less than 300 milligrams (mg) of sodium  per serving. Limit your sodium intake to less than 1,500 mg per day.  · Avoid foods that have saturated fat and trans fat.  · Choose foods that are low in cholesterol. Limit the amount of cholesterol you eat each day to less than 200 mg.  · Choose foods that are high in fiber. Eat 20-30 grams (g) of fiber each day.  · Avoid foods with added sugar. Check the food label for ingredients such as sugar, corn syrup, honey, fructose, molasses, and cane juice.  Shopping  · At the grocery store, buy most of your food from areas near the walls of the store. This includes:  ? Fresh fruits and vegetables.  ? Dry grains, beans, nuts, and seeds.  ? Fresh seafood, poultry, lean meats, and eggs.  ? Low-fat dairy products.  · Buy whole ingredients instead of prepackaged foods.  · Buy fresh, in-season fruits and vegetables from local [employer] markets.  · Buy frozen fruits and vegetables in resealable bags.  Cooking  · Prepare foods with very little salt. Use herbs or salt-free spices instead.  · Cook with heart-healthy oils, such as olive, avocado, canola, soybean, or sunflower oil.  · Avoid frying foods. Bake, grill, or broil foods instead.  · Remove visible fat and skin from meat and poultry before eating.  · Modify food textures as told by your health care provider.  Meal planning  · Eat a wide variety of colorful fruits and vegetables. Make sure one-half of your plate is filled with fruits and vegetables at each meal.  · Eat fruits and vegetables that are high in potassium, such as:  ? Apples, bananas, oranges, and melon.  ? Sweet potatoes, spinach, zucchini, and tomatoes.  · Eat fish that contain heart-healthy fats (omega-3 fats) at least twice a week. These include salmon, tuna, mackerel, and sardines.  · Eat plant foods that are high in omega-3 fats, such as flaxseeds and walnuts. Add these to cereals, yogurt, or pasta dishes.  · Eat several servings of high-fiber foods each day, such as fruits, vegetables, whole grains, and  beans.  · Do not put salt at the table for meals.  · When eating out at restaurants:  ? Ask the  about low-salt or salt-free food options.  ? Avoid fried foods. Look for menu items that are grilled, steamed, broiled, or roasted.  ? Ask if your food can be prepared without butter.  ? Ask for condiments, such as salad dressings, gravy, or sauces to be served on the side.  · If you have difficulty swallowing:  ? Choose foods that are softer and easier to chew and swallow.  ? Cut foods into small pieces and chew well before swallowing.  ? Thicken liquids as told by your health care provider or dietitian.  ? Let your health care provider know if your condition does not improve over time. You may need to work with a speech therapist to re-train the muscles that are used for eating.  General recommendations  · Involve your family and friends in your recovery, if possible. It may be helpful to have a slower meal time and to plan meals that include foods everyone in the family can eat.  · Brush your teeth with fluoride toothpaste twice a day, and floss once a day. Keeping a clean mouth can help you swallow and can also help your appetite.  · Drink enough water each day to keep your urine pale yellow. If needed, set reminders or ask your family to help you remember to drink water.  · Limit alcohol intake to no more than 1 drink a day for nonpregnant women and 2 drinks a day for men. One drink equals 12 oz of beer, 5 oz of wine, or 1½ oz of hard liquor.  Summary  · Following this eating plan can help in your stroke recovery and can decrease your risk for another stroke.  · Let your health care provider know if you have problems with swallowing. You may need to work with a speech therapist.  This information is not intended to replace advice given to you by your health care provider. Make sure you discuss any questions you have with your health care provider.  Document Released: 02/25/2019 Document Revised: 04/09/2020  Document Reviewed: 02/25/2019  Elsevier Patient Education © 2020 Elsevier Inc.

## 2020-12-08 NOTE — DISCHARGE PLANNING
Agency/Facility Name: [facility]   Spoke To: Octavio  Outcome: Liaison stated [facility] was having trouble with receiving referrals yesterday and the referrals are coming thro this morning.  CCA told Octavio she resent referral. He will be watching for it and is aware pt is d/cing today.  KAREEM Go informed    1140- Agency/Facility Name: [facility]   Spoke To: Octavio  Outcome: pt accepted. Please make PCP appointment for him.

## 2020-12-08 NOTE — THERAPY
Physical Therapy   Daily Treatment     Patient Name: Tejinder Ruiz  Age:  65 y.o., Sex:  male  Medical Record #: [id number]  Today's Date: 12/8/2020     Precautions: Fall Risk, Swallow Precautions ( See Comments)    Assessment    Pt seen for follow up PT tx at request of RN as pt wants to dc home. Pt required min assist with all transfers and ambulating due to R inattention and balance deficits. Pt required physical assist to regain JACINDA several times during session. Pt is a very high fall risk at home and will require 24/7 close supervision by brother. Cont to rec placement    Plan    Continue current treatment plan.    DC Equipment Recommendations: None  Discharge Recommendations: Recommend post-acute placement for additional physical therapy services prior to discharge home(if patient declines, 24/7 close supervision and HH)         12/08/20 1136   Cognition    Level of Consciousness Alert   Comments R inattention and safety awareness deficits   Neuro-Muscular Treatments   Neuro-Muscular Treatments Verbal Cuing;Tactile Cuing;Compensatory Strategies   Comments cues throughout for R inattention with ambulating   Other Treatments   Other Treatments Provided educated on safety and need for rehab. Pt refusing rehab despite recommendation   Neurological Concerns   Neurological Concerns Yes   Comments R inattention   Balance   Sitting Balance (Static) Good   Sitting Balance (Dynamic) Fair +   Standing Balance (Static) Fair -   Standing Balance (Dynamic) Poor +   Weight Shift Sitting Good   Weight Shift Standing Fair   Skilled Intervention Verbal Cuing;Compensatory Strategies;Sequencing   Comments no AD   Gait Analysis   Gait Level Of Assist Minimal Assist   Assistive Device None   Distance (Feet) 200   # of Times Distance was Traveled 1   Deviation Other (Comment);Decreased Base Of Support  (R inattention with LOB several times)   # of Stairs Climbed 0   Weight Bearing Status fwb   Skilled Intervention Verbal Cuing;Tactile  Cuing;Compensatory Strategies   Comments LOB R with turns and running into wall on R despite verbal cues   Bed Mobility    Supine to Sit Supervised   Sit to Supine   (NT in chair)   Scooting Supervised   Skilled Intervention Verbal Cuing   Functional Mobility   Sit to Stand Minimal Assist   Bed, Chair, Wheelchair Transfer Minimal Assist   Toilet Transfers Refused   Transfer Method Stand Step   Mobility in room and on unit with no AD   Skilled Intervention Verbal Cuing;Tactile Cuing;Compensatory Strategies   Short Term Goals    Short Term Goal # 1 Pt will perform supine<>sit from flat HOB/no railing with supervision within 6 visits to ensure independent mobility at home.    Goal Outcome # 1 Goal met   Short Term Goal # 2 Pt will perform sit<>stand with no device and supervision within 6 visits to ensure independent mobility at home.    Goal Outcome # 2 Goal not met   Short Term Goal # 3 Pt will ambulate x 150ft without device and supervision within 6 visits to ensure independent mobility at home.    Goal Outcome # 3 Goal not met   Short Term Goal # 4 Pt will ascend/descend 3 stairs with unilateral UE support and supervision within 6 visits to ensure independent mobiltiy at home.    Goal Outcome # 4 Goal not met   Anticipated Discharge Equipment and Recommendations   DC Equipment Recommendations None   Discharge Recommendations Recommend post-acute placement for additional physical therapy services prior to discharge home  (if patient declines, 24/7 close supervision and HH)

## 2020-12-08 NOTE — PROGRESS NOTES
Pt discharged home with his brother Karan who pt lives with.  Pt and his brother given discharge instructions. Karan notified that therapy states that Brandon should have 24 hour supervision, that he is a very high fall risk (tends to lean/fal to the right side and has some right sided neglect as was running into the wall).  Speech therapy cleared him for small bite sized pieces and thin liquids - Karan notified that the pt tends to be impulsive with his eating/drinking and he needs to take small bites and sips (no straws).  Emphasized that the pt is at risk for aspirating if takes too big of sips.  Karan stated that home health has already reached out to him.    Pt discharged home with all personal belongings including dentures, cell phone, and clothing.

## 2020-12-08 NOTE — DISCHARGE SUMMARY
Discharge Summary    CHIEF COMPLAINT ON ADMISSION  Chief Complaint   Patient presents with   • Possible Stroke     Patient a transfer from [facility] with a L ICA occlusion; pt to go to IR       Reason for Admission  Stroke IR     Admission Date  12/2/2020    CODE STATUS  DNAR/DNI    HPI & HOSPITAL COURSE  This is a 65 y.o. male here with acute stroke.  He was noted by his roommate to be unresponsive with right-sided weakness and aphasia he was taken to his local emergency room where he was noted TPA candidate his CTA revealed a left ICA and basilar artery thrombus so was transferred to our facility for interventional radiology evaluation.  He underwent thrombectomy and stenting by Dr. Underwood on 12/2/2020.  This was complicated by hemorrhagic transformation.  Neurologically the patient was improving and neurology recommended continuing dual antiplatelet therapy despite his hemorrhage given his recent stenting.  The patient was evaluated by physical occupational and speech therapy with recommendation for rehab referral however he was adamant about being discharged home with home health.  On examination today he has mild right hemiparesis and expressive aphasia.  He is still refusing to go to rehab and would like to be discharged home.  Home health referral has been initiated.  I reviewed with him the importance of compliant with his prescribed medical therapy especially aspirin and Plavix to prevent stent thrombosis and the importance to establish and have close follow-up with PCP after discharge.  Reviewed importance of continued abstinence from smoking after discharge.  Discussed that his newly diagnosed diabetic and will be starting him on Metformin reviewed importance of follow-up with PCP and obtain diabetic teaching        Therefore, he is discharged in good and stable condition to home with close outpatient follow-up.    The patient met 2-midnight criteria for an inpatient stay at the time of  discharge.    Discharge Date  12/8/2020    FOLLOW UP ITEMS POST DISCHARGE  Follow-up with PCP  Follow-up with neurology      DISCHARGE DIAGNOSES  Principal Problem:    Acute ischemic left MCA stroke (HCC) POA: Yes  Active Problems:    COPD (chronic obstructive pulmonary disease) (HCC) POA: Unknown    Type 2 diabetes mellitus, without long-term current use of insulin (HCC) POA: Unknown  Resolved Problems:    Acute bronchitis POA: Unknown      FOLLOW UP  No future appointments.  [facility]  [address]  [facility] [id number] [phone]        [facility]  [address] [place] [id number]  [phone]        Fish Underwood M.D.  [address] [facility] Radiology  [id number]  [facility] [address]  [phone]    In 1 month        MEDICATIONS ON DISCHARGE     Medication List      START taking these medications      Instructions   amLODIPine 10 MG Tabs  Commonly known as: NORVASC   Take 1 Tab by mouth every day.  Dose: 10 mg     aspirin 81 MG EC tablet  Start taking on: December 9, 2020   Take 1 Tab by mouth every day.  Dose: 81 mg     atorvastatin 40 MG Tabs  Commonly known as: LIPITOR   Take 1 Tab by mouth every evening.  Dose: 40 mg     clopidogrel 75 MG Tabs  Start taking on: December 9, 2020  Commonly known as: PLAVIX   Take 1 Tab by mouth every day.  Dose: 75 mg     lisinopril 10 MG Tabs  Start taking on: December 9, 2020  Commonly known as: PRINIVIL   Take 1 Tab by mouth every day.  Dose: 10 mg     metFORMIN 500 MG Tabs  Commonly known as: GLUCOPHAGE   Take 1 Tab by mouth 2 times a day, with meals.  Dose: 500 mg     nystatin 057056 UNIT/ML Susp  Commonly known as: MYCOSTATIN   Take 5 mL by mouth 4 times a day for 6 days.  Dose: 5 mL            Allergies  Not on File    DIET  Orders Placed This Encounter   Procedures   • Diet Order Diet: Level 6 - Soft and Bite Sized; Liquid level: Level 0 - Thin; Tray Modifications (optional): No Straws, SLP - 1:1 Supervision by Nursing, SLP -  Deliver to Nursing Station     Standing Status:   Standing     Number of Occurrences:   1     Order Specific Question:   Diet:     Answer:   Level 6 - Soft and Bite Sized [23]     Order Specific Question:   Liquid level     Answer:   Level 0 - Thin     Order Specific Question:   Tray Modifications (optional)     Answer:   No Straws     Order Specific Question:   Tray Modifications (optional)     Answer:   SLP - 1:1 Supervision by Nursing     Order Specific Question:   Tray Modifications (optional)     Answer:   SLP - Deliver to Nursing Station       ACTIVITY  As tolerated.  Weight bearing as tolerated    CONSULTATIONS  Neurology  Physiatry  Critical care  Palliative care    PROCEDURES      Left ICA stent and thrombectomy and MCA thrombectomy     TICI 3     LABORATORY  Lab Results   Component Value Date    SODIUM 137 12/08/2020    POTASSIUM 4.7 12/08/2020    CHLORIDE 105 12/08/2020    CO2 20 12/08/2020    GLUCOSE 174 (H) 12/08/2020    BUN 21 12/08/2020    CREATININE 0.90 12/08/2020        Lab Results   Component Value Date    WBC 8.8 12/07/2020    HEMOGLOBIN 14.4 12/07/2020    HEMATOCRIT 42.8 12/07/2020    PLATELETCT 141 (L) 12/07/2020        Total time of the discharge process exceeds 35 minutes.

## 2020-12-08 NOTE — DISCHARGE PLANNING
Hospital Care Management Discharge Planning       Anticipated Discharge Disposition:   · Home with HH     Action:   · This RN CM received update from JESÚS Wilson that Pt was accepted to [facility]  · RN CM notified Pt and Bedside RN   · Pt provided with contact information to schedule new PCP appointment   · Pt voices understanding      Barriers to Discharge:   · None     Plan:   · Hospital Care Management Team to continue to provide support services and assistance with discharge planning as needed.

## 2021-01-07 VITALS
RESPIRATION RATE: 20 BRPM | HEART RATE: 60 BPM | BODY MASS INDEX: 27.1 KG/M2 | HEIGHT: 69 IN | OXYGEN SATURATION: 95 % | SYSTOLIC BLOOD PRESSURE: 131 MMHG | DIASTOLIC BLOOD PRESSURE: 67 MMHG | WEIGHT: 182.98 LBS | TEMPERATURE: 97.4 F

## 2021-03-03 ENCOUNTER — TELEPHONE (OUTPATIENT)
Dept: PHYSICAL THERAPY | Facility: MEDICAL CENTER | Age: 66
End: 2021-03-03

## 2021-03-03 NOTE — THERAPY
Attempted to call patient three times without success. Modified Vesuvius Score 7. Number in chart is not correct; unable to reach patient.

## 2021-09-24 NOTE — PROGRESS NOTES
Bedside report received from Luz SERNA. [facility] performed at bedside. Transported with monitor, accompanied by CCT. Vital signs stable. Arrived at unit at 2353.    Home